# Patient Record
Sex: FEMALE | Race: WHITE | NOT HISPANIC OR LATINO | Employment: FULL TIME | URBAN - METROPOLITAN AREA
[De-identification: names, ages, dates, MRNs, and addresses within clinical notes are randomized per-mention and may not be internally consistent; named-entity substitution may affect disease eponyms.]

---

## 2017-09-10 ENCOUNTER — HOSPITAL ENCOUNTER (EMERGENCY)
Facility: HOSPITAL | Age: 27
Discharge: HOME | End: 2017-09-10
Attending: EMERGENCY MEDICINE
Payer: MEDICARE

## 2017-09-10 ENCOUNTER — APPOINTMENT (EMERGENCY)
Dept: CT IMAGING | Facility: HOSPITAL | Age: 27
End: 2017-09-10
Payer: MEDICARE

## 2017-09-10 VITALS
HEIGHT: 62 IN | BODY MASS INDEX: 19.14 KG/M2 | OXYGEN SATURATION: 100 % | WEIGHT: 104 LBS | SYSTOLIC BLOOD PRESSURE: 99 MMHG | TEMPERATURE: 98.8 F | HEART RATE: 78 BPM | DIASTOLIC BLOOD PRESSURE: 69 MMHG | RESPIRATION RATE: 20 BRPM

## 2017-09-10 DIAGNOSIS — R10.9 FLANK PAIN: Primary | ICD-10-CM

## 2017-09-10 LAB
ALBUMIN SERPL BCP-MCNC: 2.6 G/DL (ref 3.5–5)
ALP SERPL-CCNC: 95 U/L (ref 46–116)
ALT SERPL W P-5'-P-CCNC: 12 U/L (ref 12–78)
ANION GAP SERPL CALCULATED.3IONS-SCNC: 10 MMOL/L (ref 4–13)
AST SERPL W P-5'-P-CCNC: 17 U/L (ref 5–45)
BASOPHILS # BLD AUTO: 0.01 THOUSANDS/ΜL (ref 0–0.1)
BASOPHILS NFR BLD AUTO: 0 % (ref 0–1)
BILIRUB SERPL-MCNC: 0.3 MG/DL (ref 0.2–1)
BUN SERPL-MCNC: 10 MG/DL (ref 5–25)
CALCIUM SERPL-MCNC: 8.5 MG/DL (ref 8.3–10.1)
CHLORIDE SERPL-SCNC: 104 MMOL/L (ref 100–108)
CLARITY, POC: NORMAL
CO2 SERPL-SCNC: 27 MMOL/L (ref 21–32)
COLOR, POC: YELLOW
CREAT SERPL-MCNC: 1.1 MG/DL (ref 0.6–1.3)
EOSINOPHIL # BLD AUTO: 0.14 THOUSAND/ΜL (ref 0–0.61)
EOSINOPHIL NFR BLD AUTO: 3 % (ref 0–6)
ERYTHROCYTE [DISTWIDTH] IN BLOOD BY AUTOMATED COUNT: 14.3 % (ref 11.6–15.1)
EXT BILIRUBIN, UA: NEGATIVE
EXT BLOOD URINE: NEGATIVE
EXT GLUCOSE, UA: NEGATIVE
EXT KETONES: NEGATIVE
EXT NITRITE, UA: NEGATIVE
EXT PH, UA: 6
EXT PROTEIN, UA: NEGATIVE
EXT SPECIFIC GRAVITY, UA: 1.02
EXT UROBILINOGEN: 0.2
GFR SERPL CREATININE-BSD FRML MDRD: 69 ML/MIN/1.73SQ M
GLUCOSE SERPL-MCNC: 84 MG/DL (ref 65–140)
HCG UR QL: NEGATIVE
HCT VFR BLD AUTO: 35.7 % (ref 34.8–46.1)
HGB BLD-MCNC: 11.6 G/DL (ref 11.5–15.4)
LYMPHOCYTES # BLD AUTO: 0.83 THOUSANDS/ΜL (ref 0.6–4.47)
LYMPHOCYTES NFR BLD AUTO: 16 % (ref 14–44)
MCH RBC QN AUTO: 27.8 PG (ref 26.8–34.3)
MCHC RBC AUTO-ENTMCNC: 32.5 G/DL (ref 31.4–37.4)
MCV RBC AUTO: 86 FL (ref 82–98)
MONOCYTES # BLD AUTO: 0.43 THOUSAND/ΜL (ref 0.17–1.22)
MONOCYTES NFR BLD AUTO: 8 % (ref 4–12)
NEUTROPHILS # BLD AUTO: 3.91 THOUSANDS/ΜL (ref 1.85–7.62)
NEUTS SEG NFR BLD AUTO: 73 % (ref 43–75)
PLATELET # BLD AUTO: 271 THOUSANDS/UL (ref 149–390)
PMV BLD AUTO: 10.4 FL (ref 8.9–12.7)
POTASSIUM SERPL-SCNC: 3.9 MMOL/L (ref 3.5–5.3)
PROT SERPL-MCNC: 6.7 G/DL (ref 6.4–8.2)
RBC # BLD AUTO: 4.17 MILLION/UL (ref 3.81–5.12)
SODIUM SERPL-SCNC: 141 MMOL/L (ref 136–145)
TROPONIN I SERPL-MCNC: <0.02 NG/ML
WBC # BLD AUTO: 5.32 THOUSAND/UL (ref 4.31–10.16)
WBC # BLD EST: NEGATIVE 10*3/UL

## 2017-09-10 PROCEDURE — 74176 CT ABD & PELVIS W/O CONTRAST: CPT

## 2017-09-10 PROCEDURE — 85025 COMPLETE CBC W/AUTO DIFF WBC: CPT | Performed by: EMERGENCY MEDICINE

## 2017-09-10 PROCEDURE — 93005 ELECTROCARDIOGRAM TRACING: CPT | Performed by: EMERGENCY MEDICINE

## 2017-09-10 PROCEDURE — 81002 URINALYSIS NONAUTO W/O SCOPE: CPT | Performed by: EMERGENCY MEDICINE

## 2017-09-10 PROCEDURE — 36415 COLL VENOUS BLD VENIPUNCTURE: CPT | Performed by: EMERGENCY MEDICINE

## 2017-09-10 PROCEDURE — 84484 ASSAY OF TROPONIN QUANT: CPT | Performed by: EMERGENCY MEDICINE

## 2017-09-10 PROCEDURE — 81025 URINE PREGNANCY TEST: CPT | Performed by: EMERGENCY MEDICINE

## 2017-09-10 PROCEDURE — 80053 COMPREHEN METABOLIC PANEL: CPT | Performed by: EMERGENCY MEDICINE

## 2017-09-10 PROCEDURE — 99285 EMERGENCY DEPT VISIT HI MDM: CPT

## 2017-09-10 RX ORDER — BUPRENORPHINE HYDROCHLORIDE 8 MG/1
8 TABLET SUBLINGUAL DAILY
Status: ON HOLD | COMMUNITY
End: 2020-10-29 | Stop reason: CLARIF

## 2017-09-11 LAB
ATRIAL RATE: 70 BPM
P AXIS: 50 DEGREES
PR INTERVAL: 116 MS
QRS AXIS: 82 DEGREES
QRSD INTERVAL: 76 MS
QT INTERVAL: 394 MS
QTC INTERVAL: 425 MS
T WAVE AXIS: 12 DEGREES
VENTRICULAR RATE: 70 BPM

## 2017-11-10 ENCOUNTER — TRANSCRIBE ORDERS (OUTPATIENT)
Dept: ADMINISTRATIVE | Facility: HOSPITAL | Age: 27
End: 2017-11-10

## 2017-11-10 DIAGNOSIS — K50.919 CROHN'S DISEASE WITH COMPLICATION, UNSPECIFIED GASTROINTESTINAL TRACT LOCATION (HCC): Primary | ICD-10-CM

## 2017-12-08 ENCOUNTER — GENERIC CONVERSION - ENCOUNTER (OUTPATIENT)
Dept: OTHER | Facility: OTHER | Age: 27
End: 2017-12-08

## 2017-12-08 ENCOUNTER — HOSPITAL ENCOUNTER (OUTPATIENT)
Dept: RADIOLOGY | Facility: HOSPITAL | Age: 27
Discharge: HOME | End: 2017-12-08
Payer: MEDICARE

## 2017-12-08 DIAGNOSIS — K50.919 CROHN'S DISEASE WITH COMPLICATION, UNSPECIFIED GASTROINTESTINAL TRACT LOCATION (HCC): ICD-10-CM

## 2017-12-08 PROCEDURE — A9585 GADOBUTROL INJECTION: HCPCS | Performed by: RADIOLOGY

## 2017-12-08 PROCEDURE — 72197 MRI PELVIS W/O & W/DYE: CPT

## 2017-12-08 PROCEDURE — 74183 MRI ABD W/O CNTR FLWD CNTR: CPT

## 2017-12-08 RX ADMIN — GLUCAGON HYDROCHLORIDE 1 MG: KIT at 11:45

## 2017-12-08 RX ADMIN — GADOBUTROL 4.72 ML: 604.72 INJECTION INTRAVENOUS at 12:16

## 2018-03-27 ENCOUNTER — TRANSCRIBE ORDERS (OUTPATIENT)
Dept: ADMINISTRATIVE | Facility: HOSPITAL | Age: 28
End: 2018-03-27

## 2018-03-27 ENCOUNTER — HOSPITAL ENCOUNTER (OUTPATIENT)
Dept: RADIOLOGY | Facility: HOSPITAL | Age: 28
Discharge: HOME | End: 2018-03-27
Payer: MEDICARE

## 2018-03-27 DIAGNOSIS — R06.02 SHORTNESS OF BREATH: Primary | ICD-10-CM

## 2018-03-27 DIAGNOSIS — R06.02 SHORTNESS OF BREATH: ICD-10-CM

## 2018-03-27 PROCEDURE — 71046 X-RAY EXAM CHEST 2 VIEWS: CPT

## 2019-06-05 ENCOUNTER — HOSPITAL ENCOUNTER (EMERGENCY)
Facility: HOSPITAL | Age: 29
Discharge: HOME | End: 2019-06-05
Attending: EMERGENCY MEDICINE
Payer: COMMERCIAL

## 2019-06-05 VITALS
SYSTOLIC BLOOD PRESSURE: 112 MMHG | RESPIRATION RATE: 17 BRPM | WEIGHT: 105 LBS | HEIGHT: 62 IN | HEART RATE: 100 BPM | BODY MASS INDEX: 19.32 KG/M2 | DIASTOLIC BLOOD PRESSURE: 64 MMHG | OXYGEN SATURATION: 98 %

## 2019-06-05 DIAGNOSIS — R59.0 LYMPHADENOPATHY OF LEFT CERVICAL REGION: Primary | ICD-10-CM

## 2019-06-05 PROCEDURE — 99283 EMERGENCY DEPT VISIT LOW MDM: CPT

## 2019-06-05 PROCEDURE — 99282 EMERGENCY DEPT VISIT SF MDM: CPT | Performed by: EMERGENCY MEDICINE

## 2019-06-05 RX ORDER — IBUPROFEN 400 MG/1
400 TABLET ORAL ONCE
Status: COMPLETED | OUTPATIENT
Start: 2019-06-05 | End: 2019-06-05

## 2019-06-05 RX ADMIN — IBUPROFEN 400 MG: 400 TABLET ORAL at 13:44

## 2019-06-06 ENCOUNTER — HOSPITAL ENCOUNTER (EMERGENCY)
Facility: HOSPITAL | Age: 29
Discharge: HOME | End: 2019-06-06
Attending: EMERGENCY MEDICINE | Admitting: EMERGENCY MEDICINE
Payer: COMMERCIAL

## 2019-06-06 VITALS
OXYGEN SATURATION: 98 % | WEIGHT: 109.35 LBS | RESPIRATION RATE: 16 BRPM | DIASTOLIC BLOOD PRESSURE: 52 MMHG | BODY MASS INDEX: 20 KG/M2 | SYSTOLIC BLOOD PRESSURE: 96 MMHG | TEMPERATURE: 97.5 F | HEART RATE: 80 BPM

## 2019-06-06 DIAGNOSIS — R59.1 LYMPHADENOPATHY: Primary | ICD-10-CM

## 2019-06-06 PROCEDURE — 99283 EMERGENCY DEPT VISIT LOW MDM: CPT

## 2019-06-06 PROCEDURE — 99283 EMERGENCY DEPT VISIT LOW MDM: CPT | Performed by: PHYSICIAN ASSISTANT

## 2019-06-06 RX ORDER — IBUPROFEN 200 MG
TABLET ORAL EVERY 6 HOURS PRN
Status: ON HOLD | COMMUNITY
End: 2020-10-29 | Stop reason: CLARIF

## 2019-06-06 RX ORDER — CEPHALEXIN 500 MG/1
500 CAPSULE ORAL EVERY 8 HOURS SCHEDULED
Qty: 21 CAPSULE | Refills: 0 | Status: SHIPPED | OUTPATIENT
Start: 2019-06-06 | End: 2019-06-13

## 2019-10-11 ENCOUNTER — TRANSCRIBE ORDERS (OUTPATIENT)
Dept: ADMINISTRATIVE | Facility: HOSPITAL | Age: 29
End: 2019-10-11

## 2019-10-11 DIAGNOSIS — R06.02 SOB (SHORTNESS OF BREATH): Primary | ICD-10-CM

## 2020-02-20 ENCOUNTER — HOSPITAL ENCOUNTER (EMERGENCY)
Facility: HOSPITAL | Age: 30
Discharge: HOME | End: 2020-02-20
Attending: EMERGENCY MEDICINE | Admitting: EMERGENCY MEDICINE
Payer: COMMERCIAL

## 2020-02-20 VITALS
HEART RATE: 95 BPM | RESPIRATION RATE: 16 BRPM | WEIGHT: 110 LBS | DIASTOLIC BLOOD PRESSURE: 68 MMHG | HEIGHT: 62 IN | TEMPERATURE: 98.1 F | SYSTOLIC BLOOD PRESSURE: 128 MMHG | BODY MASS INDEX: 20.24 KG/M2 | OXYGEN SATURATION: 100 %

## 2020-02-20 DIAGNOSIS — M54.16 LUMBAR BACK PAIN WITH RADICULOPATHY AFFECTING RIGHT LOWER EXTREMITY: Primary | ICD-10-CM

## 2020-02-20 PROCEDURE — 99284 EMERGENCY DEPT VISIT MOD MDM: CPT | Performed by: EMERGENCY MEDICINE

## 2020-02-20 PROCEDURE — 96372 THER/PROPH/DIAG INJ SC/IM: CPT

## 2020-02-20 PROCEDURE — 99283 EMERGENCY DEPT VISIT LOW MDM: CPT

## 2020-02-20 RX ORDER — ACETAMINOPHEN 325 MG/1
975 TABLET ORAL ONCE
Status: COMPLETED | OUTPATIENT
Start: 2020-02-20 | End: 2020-02-20

## 2020-02-20 RX ORDER — LIDOCAINE 50 MG/G
1 PATCH TOPICAL ONCE
Status: DISCONTINUED | OUTPATIENT
Start: 2020-02-20 | End: 2020-02-20 | Stop reason: HOSPADM

## 2020-02-20 RX ORDER — KETOROLAC TROMETHAMINE 30 MG/ML
15 INJECTION, SOLUTION INTRAMUSCULAR; INTRAVENOUS ONCE
Status: COMPLETED | OUTPATIENT
Start: 2020-02-20 | End: 2020-02-20

## 2020-02-20 RX ADMIN — ACETAMINOPHEN 975 MG: 325 TABLET, FILM COATED ORAL at 20:02

## 2020-02-20 RX ADMIN — KETOROLAC TROMETHAMINE 15 MG: 30 INJECTION, SOLUTION INTRAMUSCULAR; INTRAVENOUS at 20:05

## 2020-02-20 RX ADMIN — LIDOCAINE 1 PATCH: 50 PATCH TOPICAL at 20:01

## 2020-02-21 ENCOUNTER — TELEPHONE (OUTPATIENT)
Dept: PHYSICAL THERAPY | Facility: OTHER | Age: 30
End: 2020-02-21

## 2020-02-21 NOTE — ED PROVIDER NOTES
History  Chief Complaint   Patient presents with    Back Pain     Pt with right siatic pain x 3-4 days  Denies injury  26year old female presents for evaluation of lumbar back pain radiating to the right buttock and down the right thigh for the past 3 days  Pain worsens with movement, bending and twisting  Patient states that she lifts weights at the gym and does a lot of heavy lifting, twisting and bending movements at work  Patient took ibuprofen for her pain with the last dose this morning  No significant improvement after the ibuprofen  Similar back pain in the past  She denies recent injury  No fevers, chills, nausea, vomiting, abdominal pain, constipation or diarrhea  No difficulty with urination  Currently menstruating  Regular cycles with no increase in vaginal bleeding from prior periods  History provided by:  Patient  Back Pain   Location:  Lumbar spine  Quality:  Aching and shooting  Radiates to:  R thigh  Pain severity:  Severe  Onset quality:  Gradual  Duration:  3 days  Timing:  Constant  Progression:  Waxing and waning  Chronicity:  Recurrent  Context: physical stress    Relieved by:  Nothing  Worsened by:  Twisting, movement and bending  Ineffective treatments:  NSAIDs  Associated symptoms: no abdominal pain, no chest pain, no dysuria, no fever, no headaches and no weakness        Prior to Admission Medications   Prescriptions Last Dose Informant Patient Reported? Taking? buprenorphine (SUBUTEX) 8 mg 2/20/2020 at Unknown time  Yes Yes   Sig: Place under the tongue   ibuprofen (MOTRIN) 200 mg tablet 2/20/2020 at Unknown time  Yes Yes   Sig: Take by mouth every 6 (six) hours as needed for mild pain      Facility-Administered Medications: None       Past Medical History:   Diagnosis Date    Crohn disease ([facility] [address] )        History reviewed  No pertinent surgical history  History reviewed  No pertinent family history  I have reviewed and agree with the history as documented      Social History     Tobacco Use    Smoking status: Light Tobacco Smoker    Smokeless tobacco: Never Used   Substance Use Topics    Alcohol use: Yes    Drug use: No     Comment: hx of percocet and dilaudid abuse        Review of Systems   Constitutional: Negative for appetite change, chills and fever  HENT: Negative for congestion, rhinorrhea and sore throat  Respiratory: Negative for cough, chest tightness and shortness of breath  Cardiovascular: Negative for chest pain, palpitations and leg swelling  Gastrointestinal: Negative for abdominal pain, constipation, diarrhea, nausea and vomiting  Genitourinary: Positive for vaginal bleeding (typical of usual menses )  Negative for dysuria, frequency and hematuria  Musculoskeletal: Positive for back pain  Negative for myalgias, neck pain and neck stiffness  Skin: Negative for pallor  Neurological: Negative for syncope, weakness and headaches  All other systems reviewed and are negative  Physical Exam  Physical Exam   Constitutional: She is oriented to person, place, and time  She appears well-developed and well-nourished  Non-toxic appearance  No distress  HENT:   Head: Normocephalic and atraumatic  Eyes: Pupils are equal, round, and reactive to light  Conjunctivae and EOM are normal    Neck: Normal range of motion  Neck supple  No tracheal deviation present  No thyromegaly present  Cardiovascular: Normal rate, regular rhythm, normal heart sounds and intact distal pulses  Pulmonary/Chest: Effort normal and breath sounds normal    Abdominal: Soft  Bowel sounds are normal  She exhibits no distension  There is no tenderness  Musculoskeletal:   No midline C/T/L spine tenderness  No step offs or deformities  Paraspinal tenderness bilaterally lower lumbar back  Positive straight leg on right  Normal distal sensation and perfusion  2+ DP/PT pulses bilaterally  Lymphadenopathy:     She has no cervical adenopathy     Neurological: She is alert and oriented to person, place, and time  Skin: Skin is warm and dry  She is not diaphoretic  Nursing note and vitals reviewed  Vital Signs  ED Triage Vitals [02/20/20 1926]   Temperature Pulse Respirations Blood Pressure SpO2   98 1 °F (36 7 °C) 95 16 128/68 100 %      Temp Source Heart Rate Source Patient Position - Orthostatic VS BP Location FiO2 (%)   Temporal Monitor Sitting Left arm --      Pain Score       7           Vitals:    02/20/20 1926   BP: 128/68   Pulse: 95   Patient Position - Orthostatic VS: Sitting         Visual Acuity      ED Medications  Medications   lidocaine (LIDODERM) 5 % patch 1 patch (1 patch Topical Medication Applied 2/20/20 2001)   ketorolac (TORADOL) injection 15 mg (15 mg Intramuscular Given 2/20/20 2005)   acetaminophen (TYLENOL) tablet 975 mg (975 mg Oral Given 2/20/20 2002)       Diagnostic Studies  Results Reviewed     None                 No orders to display              Procedures  Procedures         ED Course                               MDM  Number of Diagnoses or Management Options  Lumbar back pain with radiculopathy affecting right lower extremity: new and requires workup  Diagnosis management comments: 26year old female presents with lumbar back pain for 3 days  History of similar in the past from heavy lifting, twisting and bending movements at work and at the gym  No midline tenderness on exam  No recent trauma  No fevers  No urinary difficulty or numbness  Lidoderm, tylenol, toradol  Comprehensive Spine referral  Return precautions provided       Patient Progress  Patient progress: stable        Disposition  Final diagnoses:   Lumbar back pain with radiculopathy affecting right lower extremity     Time reflects when diagnosis was documented in both MDM as applicable and the Disposition within this note     Time User Action Codes Description Comment    2/20/2020  7:37 PM Brooke Verde Add [S66 [id number]] Lumbar back pain with radiculopathy affecting right lower extremity       ED Disposition     ED Disposition Condition Date/Time Comment    Discharge Stable Thu Feb 20, 2020  7:36 PM Ngoc Rylan discharge to home/self care              Follow-up Information     Follow up With Specialties Details Why Contact Info Additional Information    [facility] Physical Therapy Call  for further management of your back pain     Alan Torres, DO Internal Medicine  As needed [address] [facility] [address]         [address] Emergency Department Emergency Medicine Go to  If symptoms worsen, severe weakness, numbness, difficulty going to the bathroom, fever >101 F [address] [address]  [phone]  ED, [address] [facility], Shey [place], [address]          Discharge Medication List as of 2/20/2020  7:39 PM      CONTINUE these medications which have NOT CHANGED    Details   buprenorphine (SUBUTEX) 8 mg Place under the tongue, Historical Med      ibuprofen (MOTRIN) 200 mg tablet Take by mouth every 6 (six) hours as needed for mild pain, Historical Med               PDMP Review     None          ED Provider  Electronically Signed by           Trung Temple MD  02/20/20 2010

## 2020-02-21 NOTE — TELEPHONE ENCOUNTER
Voice mail/message left for patient to return call to [place] [id number] program including our hours of business and phone number  First attempt to contact patient regarding recent referral to our program  Deferred per protocol for f/u

## 2020-02-21 NOTE — DISCHARGE INSTRUCTIONS
Take 1000 mg of acetaminophen (Tylenol) with 400 mg of ibuprofen (Advil) every 6-8 hours as needed for pain  Lidocaine patches are available over-the-counter can be found in the back pain aisle of most pharmacies  Look for 4% lidocaine in the list of the active ingredients  These patches can be placed for 12 hours  After 12 hours, discard the patch  The next patch can be placed 12 hours later  Acute Low Back Pain   WHAT YOU NEED TO KNOW:   Acute low back pain is sudden discomfort in your lower back area that lasts for up to 6 weeks  The discomfort makes it difficult to tolerate activity  DISCHARGE INSTRUCTIONS:   Return to the emergency department if:   You have severe pain  You have sudden stiffness and heaviness on both buttocks down to both legs  You have numbness or weakness in one leg, or pain in both legs  You have numbness in your genital area or across your lower back  You cannot control your urine or bowel movements  Contact your healthcare provider if:   You have a fever  You have pain at night or when you rest     Your pain does not get better with treatment  You have pain that worsens when you cough or sneeze  You suddenly feel something pop or snap in your back  You have questions or concerns about your condition or care  Medicines: The following medicines may be ordered by your healthcare provider:  Acetaminophen  decreases pain  It is available without a doctor's order  Ask how much to take and how often to take it  Follow directions  Acetaminophen can cause liver damage if not taken correctly  NSAIDs  help decrease swelling and pain  This medicine is available with or without a doctor's order  NSAIDs can cause stomach bleeding or kidney problems in certain people  If you take blood thinner medicine, always ask your healthcare provider if NSAIDs are safe for you  Always read the medicine label and follow directions      Prescription pain medicine  may be given  Ask your healthcare provider how to take this medicine safely  Muscle relaxers  decrease pain by relaxing the muscles in your lower spine  Take your medicine as directed  Contact your healthcare provider if you think your medicine is not helping or if you have side effects  Tell him of her if you are allergic to any medicine  Keep a list of the medicines, vitamins, and herbs you take  Include the amounts, and when and why you take them  Bring the list or the pill bottles to follow-up visits  Carry your medicine list with you in case of an emergency  Self-care:   Stay active  as much as you can without causing more pain  Bed rest could make your back pain worse  Start with some light exercises such as walking  Avoid heavy lifting until your pain is gone  Ask for more information about the activities or exercises that are right for you  Ice  helps decrease swelling, pain, and muscle spams  Put crushed ice in a plastic bag  Cover it with a towel  Place it on your lower back for 20 to 30 minutes every 2 hours  Do this for about 2 to 3 days after your pain starts, or as directed  Heat  helps decrease pain and muscle spasms  Start to use heat after treatment with ice has stopped  Use a small towel dampened with warm water or a heating pad, or sit in a warm bath  Apply heat on the area for 20 to 30 minutes every 2 hours for as many days as directed  Alternate heat and ice  Prevent acute low back pain:   Use proper body mechanics  Bend at the hips and knees when you  objects  Do not bend from the waist  Use your leg muscles as you lift the load  Do not use your back  Keep the object close to your chest as you lift it  Try not to twist or lift anything above your waist     Change your position often when you stand for long periods of time  Rest one foot on a small box or footrest, and then switch to the other foot often  Try not to sit for long periods of time   When you do, sit in a straight-backed chair with your feet flat on the floor  Never reach, pull, or push while you are sitting  Do exercises that strengthen your back muscles  Warm up before you exercise  Ask your healthcare provider the best exercises for you  Maintain a healthy weight  Ask your healthcare provider how much you should weigh  Ask him to help you create a weight loss plan if you are overweight  Follow up with your healthcare provider as directed:  Return for a follow-up visit if you still have pain after 1 to 3 weeks of treatment  You may need to visit an orthopedist if your back pain lasts more than 12 weeks  Write down your questions so you remember to ask them during your visits  © 2017 [facility] Information is for End User's use only and may not be sold, redistributed or otherwise used for commercial purposes  All illustrations and images included in CareNotes® are the copyrighted property of [employer]  or Jesus Manuel Davalos  The above information is an  only  It is not intended as medical advice for individual conditions or treatments  Talk to your doctor, nurse or pharmacist before following any medical regimen to see if it is safe and effective for you

## 2020-02-22 NOTE — ED RE-EVALUATION NOTE
Addendum 2/21/2020 2005:  Patient called requesting a work note to return to work in 3 days instead of 5 days  I told patient to f/u with PCP to get a release to work in 3 days if she is feeling better after 3 days        Gennett Boast, PA-C  02/21/20 2006

## 2020-03-06 ENCOUNTER — TELEPHONE (OUTPATIENT)
Dept: PHYSICAL THERAPY | Facility: OTHER | Age: 30
End: 2020-03-06

## 2020-03-06 NOTE — TELEPHONE ENCOUNTER
Voice mail/message left for patient to return call to [place] [id number] program including our hours of business and phone number       Second attempt to contact regarding referral   Referral closed per protocol

## 2020-03-26 ENCOUNTER — HOSPITAL ENCOUNTER (EMERGENCY)
Facility: HOSPITAL | Age: 30
Discharge: HOME | End: 2020-03-26
Attending: EMERGENCY MEDICINE | Admitting: EMERGENCY MEDICINE
Payer: COMMERCIAL

## 2020-03-26 ENCOUNTER — APPOINTMENT (EMERGENCY)
Dept: CT IMAGING | Facility: HOSPITAL | Age: 30
End: 2020-03-26
Payer: COMMERCIAL

## 2020-03-26 VITALS
OXYGEN SATURATION: 98 % | HEART RATE: 70 BPM | TEMPERATURE: 98.3 F | DIASTOLIC BLOOD PRESSURE: 61 MMHG | RESPIRATION RATE: 18 BRPM | BODY MASS INDEX: 20.12 KG/M2 | SYSTOLIC BLOOD PRESSURE: 95 MMHG | WEIGHT: 110 LBS

## 2020-03-26 DIAGNOSIS — K50.911 ACUTE CROHN'S DISEASE WITH RECTAL BLEEDING (HCC): Primary | ICD-10-CM

## 2020-03-26 LAB
ABO GROUP BLD: NORMAL
ABO GROUP BLD: NORMAL
ALBUMIN SERPL BCP-MCNC: 2.6 G/DL (ref 3.5–5)
ALP SERPL-CCNC: 75 U/L (ref 46–116)
ALT SERPL W P-5'-P-CCNC: 14 U/L (ref 12–78)
ANION GAP SERPL CALCULATED.3IONS-SCNC: 5 MMOL/L (ref 4–13)
AST SERPL W P-5'-P-CCNC: 17 U/L (ref 5–45)
BASOPHILS # BLD AUTO: 0.02 THOUSANDS/ΜL (ref 0–0.1)
BASOPHILS NFR BLD AUTO: 0 % (ref 0–1)
BILIRUB SERPL-MCNC: 0.2 MG/DL (ref 0.2–1)
BLD GP AB SCN SERPL QL: NEGATIVE
BUN SERPL-MCNC: 10 MG/DL (ref 5–25)
CALCIUM SERPL-MCNC: 8.4 MG/DL (ref 8.3–10.1)
CHLORIDE SERPL-SCNC: 104 MMOL/L (ref 100–108)
CLARITY, POC: NORMAL
CO2 SERPL-SCNC: 30 MMOL/L (ref 21–32)
COLOR, POC: YELLOW
CREAT SERPL-MCNC: 1.08 MG/DL (ref 0.6–1.3)
EOSINOPHIL # BLD AUTO: 0.06 THOUSAND/ΜL (ref 0–0.61)
EOSINOPHIL NFR BLD AUTO: 1 % (ref 0–6)
ERYTHROCYTE [DISTWIDTH] IN BLOOD BY AUTOMATED COUNT: 28.3 % (ref 11.6–15.1)
EXT BILIRUBIN, UA: NORMAL
EXT BLOOD URINE: NORMAL
EXT GLUCOSE, UA: NORMAL
EXT KETONES: NORMAL
EXT NITRITE, UA: NORMAL
EXT PH, UA: 6
EXT PREG TEST URINE: NEGATIVE
EXT PROTEIN, UA: NORMAL
EXT SPECIFIC GRAVITY, UA: 1.02
EXT UROBILINOGEN: 0.2
EXT. CONTROL ED NAV: NORMAL
GFR SERPL CREATININE-BSD FRML MDRD: 69 ML/MIN/1.73SQ M
GLUCOSE SERPL-MCNC: 92 MG/DL (ref 65–140)
HCT VFR BLD AUTO: 30 % (ref 34.8–46.1)
HGB BLD-MCNC: 7.9 G/DL (ref 11.5–15.4)
IMM GRANULOCYTES # BLD AUTO: 0.01 THOUSAND/UL (ref 0–0.2)
IMM GRANULOCYTES NFR BLD AUTO: 0 % (ref 0–2)
LYMPHOCYTES # BLD AUTO: 0.38 THOUSANDS/ΜL (ref 0.6–4.47)
LYMPHOCYTES NFR BLD AUTO: 7 % (ref 14–44)
MCH RBC QN AUTO: 20.9 PG (ref 26.8–34.3)
MCHC RBC AUTO-ENTMCNC: 26.3 G/DL (ref 31.4–37.4)
MCV RBC AUTO: 79 FL (ref 82–98)
MONOCYTES # BLD AUTO: 0.34 THOUSAND/ΜL (ref 0.17–1.22)
MONOCYTES NFR BLD AUTO: 7 % (ref 4–12)
NEUTROPHILS # BLD AUTO: 4.38 THOUSANDS/ΜL (ref 1.85–7.62)
NEUTS SEG NFR BLD AUTO: 85 % (ref 43–75)
NRBC BLD AUTO-RTO: 0 /100 WBCS
PLATELET # BLD AUTO: 310 THOUSANDS/UL (ref 149–390)
PMV BLD AUTO: 9.6 FL (ref 8.9–12.7)
POTASSIUM SERPL-SCNC: 3.8 MMOL/L (ref 3.5–5.3)
PROT SERPL-MCNC: 6.3 G/DL (ref 6.4–8.2)
RBC # BLD AUTO: 3.78 MILLION/UL (ref 3.81–5.12)
RH BLD: POSITIVE
RH BLD: POSITIVE
SODIUM SERPL-SCNC: 139 MMOL/L (ref 136–145)
SPECIMEN EXPIRATION DATE: NORMAL
WBC # BLD AUTO: 5.19 THOUSAND/UL (ref 4.31–10.16)
WBC # BLD EST: NORMAL 10*3/UL

## 2020-03-26 PROCEDURE — 86900 BLOOD TYPING SEROLOGIC ABO: CPT | Performed by: PHYSICIAN ASSISTANT

## 2020-03-26 PROCEDURE — 85025 COMPLETE CBC W/AUTO DIFF WBC: CPT | Performed by: PHYSICIAN ASSISTANT

## 2020-03-26 PROCEDURE — 74177 CT ABD & PELVIS W/CONTRAST: CPT

## 2020-03-26 PROCEDURE — 81002 URINALYSIS NONAUTO W/O SCOPE: CPT | Performed by: PHYSICIAN ASSISTANT

## 2020-03-26 PROCEDURE — 81025 URINE PREGNANCY TEST: CPT | Performed by: PHYSICIAN ASSISTANT

## 2020-03-26 PROCEDURE — 96374 THER/PROPH/DIAG INJ IV PUSH: CPT

## 2020-03-26 PROCEDURE — 99285 EMERGENCY DEPT VISIT HI MDM: CPT

## 2020-03-26 PROCEDURE — 80053 COMPREHEN METABOLIC PANEL: CPT | Performed by: PHYSICIAN ASSISTANT

## 2020-03-26 PROCEDURE — 99285 EMERGENCY DEPT VISIT HI MDM: CPT | Performed by: PHYSICIAN ASSISTANT

## 2020-03-26 PROCEDURE — 36415 COLL VENOUS BLD VENIPUNCTURE: CPT | Performed by: PHYSICIAN ASSISTANT

## 2020-03-26 PROCEDURE — 86901 BLOOD TYPING SEROLOGIC RH(D): CPT | Performed by: PHYSICIAN ASSISTANT

## 2020-03-26 PROCEDURE — 86850 RBC ANTIBODY SCREEN: CPT | Performed by: PHYSICIAN ASSISTANT

## 2020-03-26 RX ORDER — PREDNISONE 20 MG/1
TABLET ORAL
Qty: 70 TABLET | Refills: 0 | Status: SHIPPED | OUTPATIENT
Start: 2020-03-26 | End: 2020-10-28 | Stop reason: ALTCHOICE

## 2020-03-26 RX ORDER — DOCUSATE SODIUM 100 MG/1
100 CAPSULE, LIQUID FILLED ORAL 3 TIMES DAILY PRN
Qty: 60 CAPSULE | Refills: 0 | Status: SHIPPED | OUTPATIENT
Start: 2020-03-26 | End: 2020-10-28 | Stop reason: ALTCHOICE

## 2020-03-26 RX ORDER — DICYCLOMINE HCL 20 MG
20 TABLET ORAL EVERY 6 HOURS
Qty: 20 TABLET | Refills: 0 | Status: SHIPPED | OUTPATIENT
Start: 2020-03-26 | End: 2020-04-08 | Stop reason: ALTCHOICE

## 2020-03-26 RX ADMIN — IOHEXOL 100 ML: 350 INJECTION, SOLUTION INTRAVENOUS at 17:34

## 2020-03-26 RX ADMIN — MORPHINE SULFATE 2 MG: 2 INJECTION, SOLUTION INTRAMUSCULAR; INTRAVENOUS at 16:43

## 2020-03-26 NOTE — ED PROVIDER NOTES
History  Chief Complaint   Patient presents with    Rectal Bleeding     c/o rectal bleeding and pain x 1 month  Pt states she had a fever, now states "I just feel good"     27-year-old female with history of Crohn's disease presents emergency department for evaluation of abdominal pain and bloody stool times 1-2 months  Patient is currently not on any Crohn's maintenance therapy, states she has been managing her symptoms well with dietary precautions and exercise  She was previously followed by the [facility] gastroenterology, has been on various maintenance therapies including methotrexate, Humira, and Entocort  Due to her persistent bloody stools, she has been getting weekly IV iron infusions due to low hemoglobin  Her last infusion was yesterday, at that time her hemoglobin was 7 4  She states that today she had a fever of 102 F and was sent home from work  She denies any respiratory symptoms such as coughing or shortness of breath  She does admit to lightheadedness and general fatigue  Also states that her abdominal pain is intermittent vomiting is much more severe than any previous flare up  She has recently established local GI care with Eliezer Owens, has an upcoming appointment for possible scope in 5 days  Will be seeing Dr Phillip Rosas  On exam, the patient is overall well-appearing in no apparent distress  She is mildly tachycardic and does occasionally appear uncomfortable while clutching her abdomen  She is generally thin does not appear well nourished  Abdomen is soft, diffusely tender to palpation, without peritoneal signs  Extremities are well perfused with strong symmetric pulses  She has moist mucous membranes and no rashes  A/P:  Hematochezia  Likely Crohn's flare up given the duration of symptoms  Will check CBC, CMP, type and screen in the event of need for blood transfusion, CT of the abdomen and pelvis to evaluate for severity    Plan to discuss with GI on-call to help with determination of inpatient versus outpatient management  Prior to Admission Medications   Prescriptions Last Dose Informant Patient Reported? Taking? buprenorphine (SUBUTEX) 8 mg   Yes No   Sig: Place under the tongue   ibuprofen (MOTRIN) 200 mg tablet   Yes No   Sig: Take by mouth every 6 (six) hours as needed for mild pain      Facility-Administered Medications: None       Past Medical History:   Diagnosis Date    Crohn disease ([facility] [address] )        History reviewed  No pertinent surgical history  History reviewed  No pertinent family history  I have reviewed and agree with the history as documented  E-Cigarette/Vaping    E-Cigarette Use Never User      E-Cigarette/Vaping Substances    Nicotine No     THC No     CBD No     Flavoring No     Other No     Unknown No      Social History     Tobacco Use    Smoking status: Former Smoker     Types: Cigarettes    Smokeless tobacco: Never Used   Substance Use Topics    Alcohol use: Never     Frequency: Never    Drug use: No     Comment: hx of percocet and dilaudid abuse        Review of Systems   Constitutional: Positive for fatigue  Negative for chills, diaphoresis and fever  Eyes: Negative for visual disturbance  Respiratory: Negative for cough and shortness of breath  Cardiovascular: Negative for chest pain and palpitations  Gastrointestinal: Positive for abdominal pain, blood in stool, constipation, nausea and vomiting  Negative for diarrhea  Genitourinary: Negative for dysuria, flank pain and frequency  Musculoskeletal: Negative for arthralgias and myalgias  Skin: Negative for color change, rash and wound  Allergic/Immunologic: Negative for immunocompromised state  Neurological: Negative for dizziness and light-headedness  Hematological: Does not bruise/bleed easily  Psychiatric/Behavioral: Negative for confusion  The patient is not nervous/anxious          Physical Exam  Physical Exam   Constitutional: She is oriented to person, place, and time  No distress  Generally thin   HENT:   Head: Normocephalic and atraumatic  Mouth/Throat: Oropharynx is clear and moist    Eyes: Pupils are equal, round, and reactive to light  No scleral icterus  Neck: No JVD present  Cardiovascular: Normal rate and regular rhythm  Exam reveals no gallop and no friction rub  No murmur heard  Pulmonary/Chest: No respiratory distress  She has no wheezes  She has no rales  Abdominal: Soft  Normal appearance and bowel sounds are normal  She exhibits no distension and no mass  There is generalized tenderness  There is no rigidity, no rebound and no guarding (voluntary)  Musculoskeletal: She exhibits no edema  Neurological: She is alert and oriented to person, place, and time  Skin: Skin is warm and dry  Capillary refill takes less than 2 seconds  She is not diaphoretic  No pallor  Psychiatric: She has a normal mood and affect  Her behavior is normal    Vitals reviewed        Vital Signs  ED Triage Vitals [03/26/20 1614]   Temperature Pulse Respirations Blood Pressure SpO2   98 3 °F (36 8 °C) 105 16 125/68 98 %      Temp Source Heart Rate Source Patient Position - Orthostatic VS BP Location FiO2 (%)   Oral Monitor Lying Right arm --      Pain Score       9           Vitals:    03/26/20 1645 03/26/20 1700 03/26/20 1730 03/26/20 1800   BP: 101/57 96/59 120/59 95/61   Pulse: 91 79 90 70   Patient Position - Orthostatic VS: Lying Lying Lying Lying         Visual Acuity      ED Medications  Medications   morphine injection 2 mg (2 mg Intravenous Given 3/26/20 1643)   iohexol (OMNIPAQUE) 350 MG/ML injection (MULTI-DOSE) 100 mL (100 mL Intravenous Given 3/26/20 1734)       Diagnostic Studies  Results Reviewed     Procedure Component Value Units Date/Time    Comprehensive metabolic panel [[id number]]  (Abnormal) Collected:  03/26/20 1633    Lab Status:  Final result Specimen:  Blood from Arm, Left Updated:  03/26/20 1655     Sodium 139 mmol/L Potassium 3 8 mmol/L      Chloride 104 mmol/L      CO2 30 mmol/L      ANION GAP 5 mmol/L      BUN 10 mg/dL      Creatinine 1 08 mg/dL      Glucose 92 mg/dL      Calcium 8 4 mg/dL      AST 17 U/L      ALT 14 U/L      Alkaline Phosphatase 75 U/L      Total Protein 6 3 g/dL      Albumin 2 6 g/dL      Total Bilirubin 0 20 mg/dL      eGFR 69 ml/min/1 73sq m     Narrative:       [place] guidelines for Chronic Kidney Disease (CKD):     Stage 1 with normal or high GFR (GFR > 90 mL/min/1 73 square meters)    Stage 2 Mild CKD (GFR = 60-89 mL/min/1 73 square meters)    Stage 3A Moderate CKD (GFR = 45-59 mL/min/1 73 square meters)    Stage 3B Moderate CKD (GFR = 30-44 mL/min/1 73 square meters)    Stage 4 Severe CKD (GFR = 15-29 mL/min/1 73 square meters)    Stage 5 End Stage CKD (GFR <15 mL/min/1 73 square meters)  Note: GFR calculation is accurate only with a steady state creatinine    POCT urinalysis dipstick [[id number]]  (Normal) Resulted:  03/26/20 1652    Lab Status:  Final result Specimen:  Urine Updated:  03/26/20 1653     Color, UA yellow     Clarity, UA cloudy     Glucose, UA (Ref: Negative) neg     Bilirubin, UA (Ref: Negative) neg     Ketones, UA (Ref: Negative) neg     Spec Grav, UA (Ref:1 003-1 030) 1 020     Blood, UA (Ref: Negative) neg     pH, UA (Ref: 4 5-8 0) 6     Protein, UA (Ref: Negative) trace     Urobilinogen, UA (Ref: 0 2- 1 0) 0 2      Leukocytes, UA (Ref: Negative) neg     Nitrite, UA (Ref: Negative) neg    POCT pregnancy, urine [[id number]]  (Normal) Resulted:  03/26/20 1652    Lab Status:  Final result Updated:  03/26/20 1652     EXT PREG TEST UR (Ref: Negative) negative     Control valid    CBC and differential [[id number]]  (Abnormal) Collected:  03/26/20 1633    Lab Status:  Final result Specimen:  Blood from Arm, Left Updated:  03/26/20 1639     WBC 5 19 Thousand/uL      RBC 3 78 Million/uL      Hemoglobin 7 9 g/dL      Hematocrit 30 0 %      MCV 79 fL MCH 20 9 pg      MCHC 26 3 g/dL      RDW 28 3 %      MPV 9 6 fL      Platelets 968 Thousands/uL      nRBC 0 /100 WBCs      Neutrophils Relative 85 %      Immat GRANS % 0 %      Lymphocytes Relative 7 %      Monocytes Relative 7 %      Eosinophils Relative 1 %      Basophils Relative 0 %      Neutrophils Absolute 4 38 Thousands/µL      Immature Grans Absolute 0 01 Thousand/uL      Lymphocytes Absolute 0 38 Thousands/µL      Monocytes Absolute 0 34 Thousand/µL      Eosinophils Absolute 0 06 Thousand/µL      Basophils Absolute 0 02 Thousands/µL                  CT abdomen pelvis with contrast   Final Result by Sai Albrecht MD (03/26 1752)   Scattered regions of small bowel (including terminal ileum) and splenic flexure wall thickening and inflammatory change consistent with uncomplicated Crohn's disease as provided in the clinical history  Bilateral nonobstructive nephrolithiasis  Workstation performed: [id number]                    Procedures  Procedures         ED Course  ED Course as of Mar 26 1807   Thu Mar 26, 2020   1642 Trending up per patient report   Hemoglobin(!): 7 9   1759 Discussed case with GI on call Dr Jai Marcelino, agrees with outpatient management  Recommends 40mg prednisone w/ weekly taper over 1 month  Pt is agreeable                                    MDM  Number of Diagnoses or Management Options  Acute Crohn's disease with rectal bleeding [facility]):   Diagnosis management comments: Patient's hemoglobin is stable and does not warrant transfusion at this time  CT shows no acute complications of acute Crohn's disease    Case was discussed in consultation with Gastroenterology on-call, patient be discharged home on prednisone taper x1 month with outpatient GI follow-up next week       Amount and/or Complexity of Data Reviewed  Clinical lab tests: ordered and reviewed  Tests in the radiology section of CPT®: ordered and reviewed  Tests in the medicine section of CPT®: ordered and reviewed  Review and summarize past medical records: yes  Discuss the patient with other providers: yes  Independent visualization of images, tracings, or specimens: yes          Disposition  Final diagnoses:   Acute Crohn's disease with rectal bleeding ([address] )     Time reflects when diagnosis was documented in both MDM as applicable and the Disposition within this note     Time User Action Codes Description Comment    3/26/2020  6:02 PM Marco Chakraborty Add [K50 911] Acute Crohn's disease with rectal bleeding [facility])       ED Disposition     ED Disposition Condition Date/Time Comment    Discharge Stable u Mar 26, 2020  6:04 PM Octavio Peter discharge to home/self care  Follow-up Information     Follow up With Specialties Details Why Contact Info    Lara Freire MD Gastroenterology   [address] [address]  [phone]            Patient's Medications   Discharge Prescriptions    DICYCLOMINE (BENTYL) 20 MG TABLET    Take 1 tablet (20 mg total) by mouth every 6 (six) hours       Start Date: 3/26/2020 End Date: --       Order Dose: 20 mg       Quantity: 20 tablet    Refills: 0    DOCUSATE SODIUM (COLACE) 100 MG CAPSULE    Take 1 capsule (100 mg total) by mouth 3 (three) times a day as needed for constipation       Start Date: 3/26/2020 End Date: --       Order Dose: 100 mg       Quantity: 60 capsule    Refills: 0    PREDNISONE 20 MG TABLET    40mg PO qd x 1 week, then 30mg PO qd x 1 week, then 20mg PO qd x1 week, then 10mg PO qd x 1 week       Start Date: 3/26/2020 End Date: --       Order Dose: --       Quantity: 70 tablet    Refills: 0     No discharge procedures on file      PDMP Review     None          ED Provider  Electronically Signed by           Mayte Mauricio PA-C  03/26/20 2107

## 2020-03-30 ENCOUNTER — OFFICE VISIT (OUTPATIENT)
Dept: GASTROENTEROLOGY | Facility: CLINIC | Age: 30
End: 2020-03-30
Payer: COMMERCIAL

## 2020-03-30 VITALS
SYSTOLIC BLOOD PRESSURE: 110 MMHG | HEIGHT: 62 IN | DIASTOLIC BLOOD PRESSURE: 80 MMHG | BODY MASS INDEX: 20.24 KG/M2 | WEIGHT: 110 LBS

## 2020-03-30 DIAGNOSIS — F19.11 HISTORY OF DRUG ABUSE IN REMISSION (HCC): ICD-10-CM

## 2020-03-30 DIAGNOSIS — K50.111 CROHN'S DISEASE OF COLON WITH RECTAL BLEEDING (HCC): Primary | ICD-10-CM

## 2020-03-30 PROCEDURE — 99244 OFF/OP CNSLTJ NEW/EST MOD 40: CPT | Performed by: INTERNAL MEDICINE

## 2020-03-30 NOTE — LETTER
March 30, 2020     Deborah Angulo   [address] [facility]  [address]    Patient: Wilfrid Peterson   YOB: 1990   Date of Visit: 3/30/2020       Dear Dr Keo Wall: Thank you for referring Wilfrid Peterson to me for evaluation  Below are my notes for this consultation  If you have questions, please do not hesitate to call me  I look forward to following your patient along with you  Sincerely,        Roberto Martinez MD        CC: No Recipients  Roberto Martinez MD  3/30/2020  8:37 AM  Sign at close encounter  [address] [facility] Outpatient Consultation  Wilfrid Peterson 26 y o  female MRN: [id number]  Encounter: [id number]          ASSESSMENT AND PLAN:      1  Crohn's disease of colon with rectal bleeding ([address] )  Long history of Crohn's disease on multiple medical regimens  On nothing over the last 2 years  But probably active disease secondary persistent iron deficiency anemia  Now presents with increasing abdominal pain and rectal bleeding   - Hepatitis B surface antibody; Future  - Hepatitis B surface antigen; Future  - Quantiferon TB Gold Plus ([facility]); Future  - continue prednisone 20 mg daily for a week and then decrease to 10 mg daily until colonoscopy  - long discussion with her about natural history of Crohn's disease and that she will need to be on some medication to keep her in remission (Entyvio versus Stelara?)    2  History of drug abuse in remission [facility])  Has been clean over 7 years    3  Iron deficiency anemia  Probably secondary to chronic GI bleeding related to Crohn's disease    ______________________________________________________________________    HPI:  Patient is seen in the office after a long absence  She has a history of Crohn's disease which was diagnosed at age 11  She had been on multiple medications which have either not worked, had side effects to, or did not take because of concerns of side effects    She also had a history of narcotic dependence/tolerance which off to confound that her clinical presentation and  She had been evaluated by Dr Daphne oRwe at [facility]  She had been off all medications since 2018 and treating herself with a low gluten low-fiber diet  She reports that she has been moving her bowels regularly without any abdominal pain  Over the last several weeks she has had increasing issues with constipation and right lower quadrant abdominal pain  She also began seeing rectal bleeding  She denies any diarrhea  She denies any upper GI symptoms  Her weight was down a couple lb but for the most part has been stable  She presented to the emergency room  She is found have a hemoglobin of 7 9 and albumin 2 6  A CT scan with areas of thickened small an and large bowel  She was placed on prednisone 20 mg daily  She reports that has helped the abdominal pain and the constipation  She has also not seen any further rectal bleeding  She denies any fevers or chills  REVIEW OF SYSTEMS:    CONSTITUTIONAL: Denies any fever, chills or rigors  But admits to fatigue and weight loss  HEENT: No earache or tinnitus  Denies hearing loss or visual disturbances  CARDIOVASCULAR: No chest pain or palpitations  RESPIRATORY: Denies any cough, hemoptysis, shortness of breath or dyspnea on exertion  GASTROINTESTINAL: As noted in the History of Present Illness  GENITOURINARY: No problems with urination  Denies any hematuria or dysuria  NEUROLOGIC: No dizziness or vertigo, denies headaches  MUSCULOSKELETAL:  Complains of low back pain that she is seeing physical therapy for  SKIN: Denies skin rashes or itching  ENDOCRINE: Denies excessive thirst  Denies intolerance to heat or cold  PSYCHOSOCIAL: Denies depression or anxiety  Denies any recent memory loss  Historical Information   Past Medical History:   Diagnosis Date    Crohn disease ([facility] [address] )      No past surgical history on file    Social History Social History     Substance and Sexual Activity   Alcohol Use Never    Frequency: Never     Social History     Substance and Sexual Activity   Drug Use No    Comment: hx of percocet and dilaudid abuse     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    Social History     Tobacco Use   Smoking Status Former Smoker    Types: Cigarettes   Smokeless Tobacco Never Used     No family history on file  Meds/Allergies       Current Outpatient Medications:     buprenorphine (SUBUTEX) 8 mg    dicyclomine (BENTYL) 20 mg tablet    docusate sodium (COLACE) 100 mg capsule    ibuprofen (MOTRIN) 200 mg tablet    predniSONE 20 mg tablet    Allergies   Allergen Reactions    Gabapentin     Vancomycin Hives           Objective     Blood pressure 110/80, height 5' 2" (1 575 m), weight 49 9 kg (110 lb)  Body mass index is 20 12 kg/m²  PHYSICAL EXAM:      General Appearance:   Alert, cooperative, no distress   HEENT:   Normocephalic, atraumatic, anicteric      Neck:  Supple, symmetrical, trachea midline   Lungs:   Clear to auscultation bilaterally; no rales, rhonchi or wheezing; respirations unlabored    Heart[de-identified]   Regular rate and rhythm; no murmur, rub, or gallop  Abdomen:   Soft, non-tender, non-distended; normal bowel sounds; no masses, no organomegaly    Genitalia:   Deferred    Rectal:   Deferred    Extremities:  No cyanosis, clubbing or edema    Pulses:  2+ and symmetric    Skin:  No jaundice, rashes, or lesions    Lymph nodes:  No palpable cervical lymphadenopathy        Lab Results:   No visits with results within 1 Day(s) from this visit     Latest known visit with results is:   Admission on 03/26/2020, Discharged on 03/26/2020   Component Date Value    WBC 03/26/2020 5 19     RBC 03/26/2020 3 78*    Hemoglobin 03/26/2020 7 9*    Hematocrit 03/26/2020 30 0*    MCV 03/26/2020 79*    MCH 03/26/2020 20 9*    MCHC 03/26/2020 26 3*    RDW 03/26/2020 28 3*    MPV 03/26/2020 9 6     Platelets 17/38/8035 310     nRBC 03/26/2020 0     Neutrophils Relative 03/26/2020 85*    Immat GRANS % 03/26/2020 0     Lymphocytes Relative 03/26/2020 7*    Monocytes Relative 03/26/2020 7     Eosinophils Relative 03/26/2020 1     Basophils Relative 03/26/2020 0     Neutrophils Absolute 03/26/2020 4 38     Immature Grans Absolute 03/26/2020 0 01     Lymphocytes Absolute 03/26/2020 0 38*    Monocytes Absolute 03/26/2020 0 34     Eosinophils Absolute 03/26/2020 0 06     Basophils Absolute 03/26/2020 0 02     Sodium 03/26/2020 139     Potassium 03/26/2020 3 8     Chloride 03/26/2020 104     CO2 03/26/2020 30     ANION GAP 03/26/2020 5     BUN 03/26/2020 10     Creatinine 03/26/2020 1 08     Glucose 03/26/2020 92     Calcium 03/26/2020 8 4     AST 03/26/2020 17     ALT 03/26/2020 14     Alkaline Phosphatase 03/26/2020 75     Total Protein 03/26/2020 6 3*    Albumin 03/26/2020 2 6*    Total Bilirubin 03/26/2020 0 20     eGFR 03/26/2020 69     ABO Grouping 03/26/2020 A     Rh Factor 03/26/2020 Positive     Antibody Screen 03/26/2020 Negative     Specimen Expiration Date 03/26/2020 09121544     Color, UA 03/26/2020 yellow     Clarity, UA 03/26/2020 cloudy     Glucose, UA (Ref: Negati* 03/26/2020 neg     Bilirubin, UA (Ref: Nega* 03/26/2020 neg     Ketones, UA (Ref: Negati* 03/26/2020 neg     Spec Grav, UA (Ref:1 003* 03/26/2020 1 020     Blood, UA (Ref: Negative) 03/26/2020 neg     pH, UA (Ref: 4 5-8 0) 03/26/2020 6     Protein, UA (Ref: Negati* 03/26/2020 trace     Urobilinogen, UA (Ref: 0* 03/26/2020 0 2      Leukocytes, UA (Ref: Ne* 03/26/2020 neg     Nitrite, UA (Ref: Negati* 03/26/2020 neg     EXT PREG TEST UR (Ref: N* 03/26/2020 negative     Control 03/26/2020 valid     ABO Grouping 03/26/2020 A     Rh Factor 03/26/2020 Positive          Radiology Results:   Ct Abdomen Pelvis With Contrast    Result Date: 3/26/2020  Narrative: CT ABDOMEN AND PELVIS WITH IV CONTRAST INDICATION:   abd pain, hematochezia, hx of Crohn's  COMPARISON:  9/10/2017 TECHNIQUE:  CT examination of the abdomen and pelvis was performed  Axial, sagittal, and coronal 2D reformatted images were created from the source data and submitted for interpretation  Radiation dose length product (DLP) for this visit:  431 mGy-cm   This examination, like all CT scans performed in the [facility], was performed utilizing techniques to minimize radiation dose exposure, including the use of iterative reconstruction and automated exposure control  IV Contrast:  100 mL of iohexol (OMNIPAQUE) Enteric Contrast:  Enteric contrast was not administered  FINDINGS: ABDOMEN LOWER CHEST:  No clinically significant abnormality identified in the visualized lower chest  LIVER/BILIARY TREE:  Unremarkable  GALLBLADDER:  No calcified gallstones  No pericholecystic inflammatory change  SPLEEN:  Unremarkable  PANCREAS:  Unremarkable  ADRENAL GLANDS:  Unremarkable  KIDNEYS/URETERS:  Bilateral nonobstructive nephrolithiasis  Lobulated appearance and mild atrophy involving the right kidney similar to prior study  STOMACH AND BOWEL:  Splenic flexure focal colonic wall thickening and mild inflammatory change  Terminal ileum also demonstrates wall thickening and minimal inflammatory change with additional scattered small bowel loops with similar findings  APPENDIX:  A normal appendix was visualized  ABDOMINOPELVIC CAVITY:  There is a small volume of free pelvic fluid, likely physiologic given the patient's age and gender  No free intraperitoneal air  No lymphadenopathy  VESSELS:  Unremarkable for patient's age  PELVIS REPRODUCTIVE ORGANS:  Unremarkable for patient's age  URINARY BLADDER:  Unremarkable  ABDOMINAL WALL/INGUINAL REGIONS:  Unremarkable  OSSEOUS STRUCTURES:  No acute fracture or destructive osseous lesion       Impression: Scattered regions of small bowel (including terminal ileum) and splenic flexure wall thickening and inflammatory change consistent with uncomplicated Crohn's disease as provided in the clinical history  Bilateral nonobstructive nephrolithiasis   Workstation performed: [id number]

## 2020-03-30 NOTE — PROGRESS NOTES
[facility] - Outpatient Consultation  Michael Jesus 26 y o  female MRN: [id number]  Encounter: [id number]          ASSESSMENT AND PLAN:      1  Crohn's disease of colon with rectal bleeding ([address] )  Long history of Crohn's disease on multiple medical regimens  On nothing over the last 2 years  But probably active disease secondary persistent iron deficiency anemia  Now presents with increasing abdominal pain and rectal bleeding   - Hepatitis B surface antibody; Future  - Hepatitis B surface antigen; Future  - Quantiferon TB Gold Plus ([facility]); Future  - continue prednisone 20 mg daily for a week and then decrease to 10 mg daily until colonoscopy  - long discussion with her about natural history of Crohn's disease and that she will need to be on some medication to keep her in remission (Entyvio versus Stelara?)  - colonoscopy    2  History of drug abuse in remission [facility])  Has been clean over 7 years    3  Iron deficiency anemia  Probably secondary to chronic GI bleeding related to Crohn's disease    ______________________________________________________________________    HPI:  Patient is seen in the office after a long absence  She has a history of Crohn's disease which was diagnosed at age 11  She had been on multiple medications which have either not worked, had side effects to, or did not take because of concerns of side effects  She also had a history of narcotic dependence/tolerance which off to confound that her clinical presentation and  She had been evaluated by Dr Dale Kiran at [facility]  She had been off all medications since 2018 and treating herself with a low gluten low-fiber diet  She reports that she has been moving her bowels regularly without any abdominal pain  Over the last several weeks she has had increasing issues with constipation and right lower quadrant abdominal pain  She also began seeing rectal bleeding  She denies any diarrhea    She denies any upper GI symptoms  Her weight was down a couple lb but for the most part has been stable  She presented to the emergency room  She is found have a hemoglobin of 7 9 and albumin 2 6  A CT scan with areas of thickened small an and large bowel  She was placed on prednisone 20 mg daily  She reports that has helped the abdominal pain and the constipation  She has also not seen any further rectal bleeding  She denies any fevers or chills  REVIEW OF SYSTEMS:    CONSTITUTIONAL: Denies any fever, chills or rigors  But admits to fatigue and weight loss  HEENT: No earache or tinnitus  Denies hearing loss or visual disturbances  CARDIOVASCULAR: No chest pain or palpitations  RESPIRATORY: Denies any cough, hemoptysis, shortness of breath or dyspnea on exertion  GASTROINTESTINAL: As noted in the History of Present Illness  GENITOURINARY: No problems with urination  Denies any hematuria or dysuria  NEUROLOGIC: No dizziness or vertigo, denies headaches  MUSCULOSKELETAL:  Complains of low back pain that she is seeing physical therapy for  SKIN: Denies skin rashes or itching  ENDOCRINE: Denies excessive thirst  Denies intolerance to heat or cold  PSYCHOSOCIAL: Denies depression or anxiety  Denies any recent memory loss  Historical Information   Past Medical History:   Diagnosis Date    Crohn disease ([facility] [address] )      No past surgical history on file  Social History   Social History     Substance and Sexual Activity   Alcohol Use Never    Frequency: Never     Social History     Substance and Sexual Activity   Drug Use No    Comment: hx of percocet and dilaudid abuse     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    Social History     Tobacco Use   Smoking Status Former Smoker    Types: Cigarettes   Smokeless Tobacco Never Used     No family history on file      Meds/Allergies       Current Outpatient Medications:     buprenorphine (SUBUTEX) 8 mg    dicyclomine (BENTYL) 20 mg tablet    docusate sodium (COLACE) 100 mg capsule    ibuprofen (MOTRIN) 200 mg tablet    predniSONE 20 mg tablet    Allergies   Allergen Reactions    Gabapentin     Vancomycin Hives           Objective     Blood pressure 110/80, height 5' 2" (1 575 m), weight 49 9 kg (110 lb)  Body mass index is 20 12 kg/m²  PHYSICAL EXAM:      General Appearance:   Alert, cooperative, no distress   HEENT:   Normocephalic, atraumatic, anicteric      Neck:  Supple, symmetrical, trachea midline   Lungs:   Clear to auscultation bilaterally; no rales, rhonchi or wheezing; respirations unlabored    Heart[de-identified]   Regular rate and rhythm; no murmur, rub, or gallop  Abdomen:   Soft, non-tender, non-distended; normal bowel sounds; no masses, no organomegaly    Genitalia:   Deferred    Rectal:   Deferred    Extremities:  No cyanosis, clubbing or edema    Pulses:  2+ and symmetric    Skin:  No jaundice, rashes, or lesions    Lymph nodes:  No palpable cervical lymphadenopathy        Lab Results:   No visits with results within 1 Day(s) from this visit     Latest known visit with results is:   Admission on 03/26/2020, Discharged on 03/26/2020   Component Date Value    WBC 03/26/2020 5 19     RBC 03/26/2020 3 78*    Hemoglobin 03/26/2020 7 9*    Hematocrit 03/26/2020 30 0*    MCV 03/26/2020 79*    MCH 03/26/2020 20 9*    MCHC 03/26/2020 26 3*    RDW 03/26/2020 28 3*    MPV 03/26/2020 9 6     Platelets 75/91/5457 310     nRBC 03/26/2020 0     Neutrophils Relative 03/26/2020 85*    Immat GRANS % 03/26/2020 0     Lymphocytes Relative 03/26/2020 7*    Monocytes Relative 03/26/2020 7     Eosinophils Relative 03/26/2020 1     Basophils Relative 03/26/2020 0     Neutrophils Absolute 03/26/2020 4 38     Immature Grans Absolute 03/26/2020 0 01     Lymphocytes Absolute 03/26/2020 0 38*    Monocytes Absolute 03/26/2020 0 34     Eosinophils Absolute 03/26/2020 0 06     Basophils Absolute 03/26/2020 0 02     Sodium 03/26/2020 139     Potassium 03/26/2020 3 8     Chloride 03/26/2020 104     CO2 03/26/2020 30     ANION GAP 03/26/2020 5     BUN 03/26/2020 10     Creatinine 03/26/2020 1 08     Glucose 03/26/2020 92     Calcium 03/26/2020 8 4     AST 03/26/2020 17     ALT 03/26/2020 14     Alkaline Phosphatase 03/26/2020 75     Total Protein 03/26/2020 6 3*    Albumin 03/26/2020 2 6*    Total Bilirubin 03/26/2020 0 20     eGFR 03/26/2020 69     ABO Grouping 03/26/2020 A     Rh Factor 03/26/2020 Positive     Antibody Screen 03/26/2020 Negative     Specimen Expiration Date 03/26/2020 [id number]     Color, UA 03/26/2020 yellow     Clarity, UA 03/26/2020 cloudy     Glucose, UA (Ref: Negati* 03/26/2020 neg     Bilirubin, UA (Ref: Nega* 03/26/2020 neg     Ketones, UA (Ref: Negati* 03/26/2020 neg     Spec Grav, UA (Ref:1 003* 03/26/2020 1 020     Blood, UA (Ref: Negative) 03/26/2020 neg     pH, UA (Ref: 4 5-8 0) 03/26/2020 6     Protein, UA (Ref: Negati* 03/26/2020 trace     Urobilinogen, UA (Ref: 0* 03/26/2020 0 2      Leukocytes, UA (Ref: Ne* 03/26/2020 neg     Nitrite, UA (Ref: Negati* 03/26/2020 neg     EXT PREG TEST UR (Ref: N* 03/26/2020 negative     Control 03/26/2020 valid     ABO Grouping 03/26/2020 A     Rh Factor 03/26/2020 Positive          Radiology Results:   Ct Abdomen Pelvis With Contrast    Result Date: 3/26/2020  Narrative: CT ABDOMEN AND PELVIS WITH IV CONTRAST INDICATION:   abd pain, hematochezia, hx of Crohn's  COMPARISON:  9/10/2017 TECHNIQUE:  CT examination of the abdomen and pelvis was performed  Axial, sagittal, and coronal 2D reformatted images were created from the source data and submitted for interpretation  Radiation dose length product (DLP) for this visit:  431 mGy-cm   This examination, like all CT scans performed in the [facility], was performed utilizing techniques to minimize radiation dose exposure, including the use of iterative reconstruction and automated exposure control   IV Contrast:  100 mL of iohexol (OMNIPAQUE) Enteric Contrast:  Enteric contrast was not administered  FINDINGS: ABDOMEN LOWER CHEST:  No clinically significant abnormality identified in the visualized lower chest  LIVER/BILIARY TREE:  Unremarkable  GALLBLADDER:  No calcified gallstones  No pericholecystic inflammatory change  SPLEEN:  Unremarkable  PANCREAS:  Unremarkable  ADRENAL GLANDS:  Unremarkable  KIDNEYS/URETERS:  Bilateral nonobstructive nephrolithiasis  Lobulated appearance and mild atrophy involving the right kidney similar to prior study  STOMACH AND BOWEL:  Splenic flexure focal colonic wall thickening and mild inflammatory change  Terminal ileum also demonstrates wall thickening and minimal inflammatory change with additional scattered small bowel loops with similar findings  APPENDIX:  A normal appendix was visualized  ABDOMINOPELVIC CAVITY:  There is a small volume of free pelvic fluid, likely physiologic given the patient's age and gender  No free intraperitoneal air  No lymphadenopathy  VESSELS:  Unremarkable for patient's age  PELVIS REPRODUCTIVE ORGANS:  Unremarkable for patient's age  URINARY BLADDER:  Unremarkable  ABDOMINAL WALL/INGUINAL REGIONS:  Unremarkable  OSSEOUS STRUCTURES:  No acute fracture or destructive osseous lesion  Impression: Scattered regions of small bowel (including terminal ileum) and splenic flexure wall thickening and inflammatory change consistent with uncomplicated Crohn's disease as provided in the clinical history  Bilateral nonobstructive nephrolithiasis   Workstation performed: [id number]

## 2020-03-30 NOTE — LETTER
March 30, 2020     Deni Jimenez   [address],[facility]  [address]    Patient: Maria De Jesus Ruiz   YOB: 1990   Date of Visit: 3/30/2020       Dear Dr Lima November: Thank you for referring Maria De Jesus Ruiz to me for evaluation  Below are my notes for this consultation  If you have questions, please do not hesitate to call me  I look forward to following your patient along with you  Sincerely,        Lei Like Karyle Aland, MD        CC: No Recipients  Lei Like Karyle Aland, MD  3/30/2020  8:38 AM  Sign at close encounter  [facility] - Outpatient Consultation  Maria De Jesus Ruiz 26 y o  female MRN: [id number]  Encounter: [id number]          ASSESSMENT AND PLAN:      1  Crohn's disease of colon with rectal bleeding ([address] )  Long history of Crohn's disease on multiple medical regimens  On nothing over the last 2 years  But probably active disease secondary persistent iron deficiency anemia  Now presents with increasing abdominal pain and rectal bleeding   - Hepatitis B surface antibody; Future  - Hepatitis B surface antigen; Future  - Quantiferon TB Gold Plus ([facility]); Future  - continue prednisone 20 mg daily for a week and then decrease to 10 mg daily until colonoscopy  - long discussion with her about natural history of Crohn's disease and that she will need to be on some medication to keep her in remission (Entyvio versus Stelara?)  - colonoscopy    2  History of drug abuse in remission [facility])  Has been clean over 7 years    3  Iron deficiency anemia  Probably secondary to chronic GI bleeding related to Crohn's disease    ______________________________________________________________________    HPI:  Patient is seen in the office after a long absence  She has a history of Crohn's disease which was diagnosed at age 11   She had been on multiple medications which have either not worked, had side effects to, or did not take because of concerns of side effects  She also had a history of narcotic dependence/tolerance which off to confound that her clinical presentation and  She had been evaluated by Dr Andre Cardoza at [facility]  She had been off all medications since 2018 and treating herself with a low gluten low-fiber diet  She reports that she has been moving her bowels regularly without any abdominal pain  Over the last several weeks she has had increasing issues with constipation and right lower quadrant abdominal pain  She also began seeing rectal bleeding  She denies any diarrhea  She denies any upper GI symptoms  Her weight was down a couple lb but for the most part has been stable  She presented to the emergency room  She is found have a hemoglobin of 7 9 and albumin 2 6  A CT scan with areas of thickened small an and large bowel  She was placed on prednisone 20 mg daily  She reports that has helped the abdominal pain and the constipation  She has also not seen any further rectal bleeding  She denies any fevers or chills  REVIEW OF SYSTEMS:    CONSTITUTIONAL: Denies any fever, chills or rigors  But admits to fatigue and weight loss  HEENT: No earache or tinnitus  Denies hearing loss or visual disturbances  CARDIOVASCULAR: No chest pain or palpitations  RESPIRATORY: Denies any cough, hemoptysis, shortness of breath or dyspnea on exertion  GASTROINTESTINAL: As noted in the History of Present Illness  GENITOURINARY: No problems with urination  Denies any hematuria or dysuria  NEUROLOGIC: No dizziness or vertigo, denies headaches  MUSCULOSKELETAL:  Complains of low back pain that she is seeing physical therapy for  SKIN: Denies skin rashes or itching  ENDOCRINE: Denies excessive thirst  Denies intolerance to heat or cold  PSYCHOSOCIAL: Denies depression or anxiety  Denies any recent memory loss         Historical Information   Past Medical History:   Diagnosis Date    Crohn disease ([facility] [address] )      No past surgical history on file   Social History   Social History     Substance and Sexual Activity   Alcohol Use Never    Frequency: Never     Social History     Substance and Sexual Activity   Drug Use No    Comment: hx of percocet and dilaudid abuse     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    Social History     Tobacco Use   Smoking Status Former Smoker    Types: Cigarettes   Smokeless Tobacco Never Used     No family history on file  Meds/Allergies       Current Outpatient Medications:     buprenorphine (SUBUTEX) 8 mg    dicyclomine (BENTYL) 20 mg tablet    docusate sodium (COLACE) 100 mg capsule    ibuprofen (MOTRIN) 200 mg tablet    predniSONE 20 mg tablet    Allergies   Allergen Reactions    Gabapentin     Vancomycin Hives           Objective     Blood pressure 110/80, height 5' 2" (1 575 m), weight 49 9 kg (110 lb)  Body mass index is 20 12 kg/m²  PHYSICAL EXAM:      General Appearance:   Alert, cooperative, no distress   HEENT:   Normocephalic, atraumatic, anicteric      Neck:  Supple, symmetrical, trachea midline   Lungs:   Clear to auscultation bilaterally; no rales, rhonchi or wheezing; respirations unlabored    Heart[de-identified]   Regular rate and rhythm; no murmur, rub, or gallop  Abdomen:   Soft, non-tender, non-distended; normal bowel sounds; no masses, no organomegaly    Genitalia:   Deferred    Rectal:   Deferred    Extremities:  No cyanosis, clubbing or edema    Pulses:  2+ and symmetric    Skin:  No jaundice, rashes, or lesions    Lymph nodes:  No palpable cervical lymphadenopathy        Lab Results:   No visits with results within 1 Day(s) from this visit     Latest known visit with results is:   Admission on 03/26/2020, Discharged on 03/26/2020   Component Date Value    WBC 03/26/2020 5 19     RBC 03/26/2020 3 78*    Hemoglobin 03/26/2020 7 9*    Hematocrit 03/26/2020 30 0*    MCV 03/26/2020 79*    MCH 03/26/2020 20 9*    MCHC 03/26/2020 26 3*    RDW 03/26/2020 28 3*    MPV 03/26/2020 9 6     Platelets 03/26/2020 310     nRBC 03/26/2020 0     Neutrophils Relative 03/26/2020 85*    Immat GRANS % 03/26/2020 0     Lymphocytes Relative 03/26/2020 7*    Monocytes Relative 03/26/2020 7     Eosinophils Relative 03/26/2020 1     Basophils Relative 03/26/2020 0     Neutrophils Absolute 03/26/2020 4 38     Immature Grans Absolute 03/26/2020 0 01     Lymphocytes Absolute 03/26/2020 0 38*    Monocytes Absolute 03/26/2020 0 34     Eosinophils Absolute 03/26/2020 0 06     Basophils Absolute 03/26/2020 0 02     Sodium 03/26/2020 139     Potassium 03/26/2020 3 8     Chloride 03/26/2020 104     CO2 03/26/2020 30     ANION GAP 03/26/2020 5     BUN 03/26/2020 10     Creatinine 03/26/2020 1 08     Glucose 03/26/2020 92     Calcium 03/26/2020 8 4     AST 03/26/2020 17     ALT 03/26/2020 14     Alkaline Phosphatase 03/26/2020 75     Total Protein 03/26/2020 6 3*    Albumin 03/26/2020 2 6*    Total Bilirubin 03/26/2020 0 20     eGFR 03/26/2020 69     ABO Grouping 03/26/2020 A     Rh Factor 03/26/2020 Positive     Antibody Screen 03/26/2020 Negative     Specimen Expiration Date 03/26/2020 [id number]     Color, UA 03/26/2020 yellow     Clarity, UA 03/26/2020 cloudy     Glucose, UA (Ref: Negati* 03/26/2020 neg     Bilirubin, UA (Ref: Nega* 03/26/2020 neg     Ketones, UA (Ref: Negati* 03/26/2020 neg     Spec Grav, UA (Ref:1 003* 03/26/2020 1 020     Blood, UA (Ref: Negative) 03/26/2020 neg     pH, UA (Ref: 4 5-8 0) 03/26/2020 6     Protein, UA (Ref: Negati* 03/26/2020 trace     Urobilinogen, UA (Ref: 0* 03/26/2020 0 2      Leukocytes, UA (Ref: Ne* 03/26/2020 neg     Nitrite, UA (Ref: Negati* 03/26/2020 neg     EXT PREG TEST UR (Ref: N* 03/26/2020 negative     Control 03/26/2020 valid     ABO Grouping 03/26/2020 A     Rh Factor 03/26/2020 Positive          Radiology Results:   Ct Abdomen Pelvis With Contrast    Result Date: 3/26/2020  Narrative: CT ABDOMEN AND PELVIS WITH IV CONTRAST INDICATION: abd pain, hematochezia, hx of Crohn's  COMPARISON:  9/10/2017 TECHNIQUE:  CT examination of the abdomen and pelvis was performed  Axial, sagittal, and coronal 2D reformatted images were created from the source data and submitted for interpretation  Radiation dose length product (DLP) for this visit:  431 mGy-cm   This examination, like all CT scans performed in the [facility], was performed utilizing techniques to minimize radiation dose exposure, including the use of iterative reconstruction and automated exposure control  IV Contrast:  100 mL of iohexol (OMNIPAQUE) Enteric Contrast:  Enteric contrast was not administered  FINDINGS: ABDOMEN LOWER CHEST:  No clinically significant abnormality identified in the visualized lower chest  LIVER/BILIARY TREE:  Unremarkable  GALLBLADDER:  No calcified gallstones  No pericholecystic inflammatory change  SPLEEN:  Unremarkable  PANCREAS:  Unremarkable  ADRENAL GLANDS:  Unremarkable  KIDNEYS/URETERS:  Bilateral nonobstructive nephrolithiasis  Lobulated appearance and mild atrophy involving the right kidney similar to prior study  STOMACH AND BOWEL:  Splenic flexure focal colonic wall thickening and mild inflammatory change  Terminal ileum also demonstrates wall thickening and minimal inflammatory change with additional scattered small bowel loops with similar findings  APPENDIX:  A normal appendix was visualized  ABDOMINOPELVIC CAVITY:  There is a small volume of free pelvic fluid, likely physiologic given the patient's age and gender  No free intraperitoneal air  No lymphadenopathy  VESSELS:  Unremarkable for patient's age  PELVIS REPRODUCTIVE ORGANS:  Unremarkable for patient's age  URINARY BLADDER:  Unremarkable  ABDOMINAL WALL/INGUINAL REGIONS:  Unremarkable  OSSEOUS STRUCTURES:  No acute fracture or destructive osseous lesion       Impression: Scattered regions of small bowel (including terminal ileum) and splenic flexure wall thickening and inflammatory change consistent with uncomplicated Crohn's disease as provided in the clinical history  Bilateral nonobstructive nephrolithiasis   Workstation performed: [id number]

## 2020-04-06 ENCOUNTER — ANESTHESIA EVENT (OUTPATIENT)
Dept: GASTROENTEROLOGY | Facility: HOSPITAL | Age: 30
End: 2020-04-06

## 2020-04-08 RX ORDER — SERTRALINE HYDROCHLORIDE 100 MG/1
150 TABLET, FILM COATED ORAL DAILY
COMMUNITY

## 2020-04-10 ENCOUNTER — HOSPITAL ENCOUNTER (OUTPATIENT)
Dept: GASTROENTEROLOGY | Facility: HOSPITAL | Age: 30
Setting detail: OUTPATIENT SURGERY
Discharge: HOME | End: 2020-04-10
Attending: INTERNAL MEDICINE | Admitting: INTERNAL MEDICINE
Payer: COMMERCIAL

## 2020-04-10 ENCOUNTER — ANESTHESIA (OUTPATIENT)
Dept: GASTROENTEROLOGY | Facility: HOSPITAL | Age: 30
End: 2020-04-10

## 2020-04-10 VITALS
BODY MASS INDEX: 20.24 KG/M2 | TEMPERATURE: 97.6 F | HEIGHT: 62 IN | HEART RATE: 58 BPM | OXYGEN SATURATION: 99 % | SYSTOLIC BLOOD PRESSURE: 130 MMHG | DIASTOLIC BLOOD PRESSURE: 76 MMHG | RESPIRATION RATE: 20 BRPM | WEIGHT: 110 LBS

## 2020-04-10 DIAGNOSIS — K50.111 CROHN'S DISEASE OF COLON WITH RECTAL BLEEDING (HCC): ICD-10-CM

## 2020-04-10 LAB
EXT PREGNANCY TEST URINE: NEGATIVE
EXT. CONTROL: NORMAL

## 2020-04-10 PROCEDURE — 45380 COLONOSCOPY AND BIOPSY: CPT | Performed by: INTERNAL MEDICINE

## 2020-04-10 PROCEDURE — 88305 TISSUE EXAM BY PATHOLOGIST: CPT | Performed by: PATHOLOGY

## 2020-04-10 PROCEDURE — 81025 URINE PREGNANCY TEST: CPT | Performed by: INTERNAL MEDICINE

## 2020-04-10 RX ORDER — LABETALOL 20 MG/4 ML (5 MG/ML) INTRAVENOUS SYRINGE
5 AS NEEDED
Status: DISCONTINUED | OUTPATIENT
Start: 2020-04-10 | End: 2020-04-14 | Stop reason: HOSPADM

## 2020-04-10 RX ORDER — SODIUM CHLORIDE 9 MG/ML
75 INJECTION, SOLUTION INTRAVENOUS CONTINUOUS
Status: DISCONTINUED | OUTPATIENT
Start: 2020-04-10 | End: 2020-04-14 | Stop reason: HOSPADM

## 2020-04-10 RX ORDER — PROPOFOL 10 MG/ML
INJECTION, EMULSION INTRAVENOUS AS NEEDED
Status: DISCONTINUED | OUTPATIENT
Start: 2020-04-10 | End: 2020-04-10 | Stop reason: SURG

## 2020-04-10 RX ORDER — MEPERIDINE HYDROCHLORIDE 25 MG/ML
12.5 INJECTION INTRAMUSCULAR; INTRAVENOUS; SUBCUTANEOUS
Status: DISCONTINUED | OUTPATIENT
Start: 2020-04-10 | End: 2020-04-14 | Stop reason: HOSPADM

## 2020-04-10 RX ORDER — FENTANYL CITRATE/PF 50 MCG/ML
25 SYRINGE (ML) INJECTION
Status: DISCONTINUED | OUTPATIENT
Start: 2020-04-10 | End: 2020-04-14 | Stop reason: HOSPADM

## 2020-04-10 RX ORDER — METOCLOPRAMIDE HYDROCHLORIDE 5 MG/ML
10 INJECTION INTRAMUSCULAR; INTRAVENOUS ONCE AS NEEDED
Status: DISCONTINUED | OUTPATIENT
Start: 2020-04-10 | End: 2020-04-14 | Stop reason: HOSPADM

## 2020-04-10 RX ORDER — ONDANSETRON 2 MG/ML
4 INJECTION INTRAMUSCULAR; INTRAVENOUS ONCE AS NEEDED
Status: DISCONTINUED | OUTPATIENT
Start: 2020-04-10 | End: 2020-04-14 | Stop reason: HOSPADM

## 2020-04-10 RX ORDER — SODIUM CHLORIDE 9 MG/ML
INJECTION, SOLUTION INTRAVENOUS CONTINUOUS PRN
Status: DISCONTINUED | OUTPATIENT
Start: 2020-04-10 | End: 2020-04-10 | Stop reason: SURG

## 2020-04-10 RX ORDER — HYDRALAZINE HYDROCHLORIDE 20 MG/ML
5 INJECTION INTRAMUSCULAR; INTRAVENOUS AS NEEDED
Status: DISCONTINUED | OUTPATIENT
Start: 2020-04-10 | End: 2020-04-14 | Stop reason: HOSPADM

## 2020-04-10 RX ORDER — PROMETHAZINE HYDROCHLORIDE 25 MG/ML
6.25 INJECTION, SOLUTION INTRAMUSCULAR; INTRAVENOUS ONCE AS NEEDED
Status: DISCONTINUED | OUTPATIENT
Start: 2020-04-10 | End: 2020-04-14 | Stop reason: HOSPADM

## 2020-04-10 RX ADMIN — SODIUM CHLORIDE: 0.9 INJECTION, SOLUTION INTRAVENOUS at 07:04

## 2020-04-10 RX ADMIN — PROPOFOL 20 MG: 10 INJECTION, EMULSION INTRAVENOUS at 07:37

## 2020-04-10 RX ADMIN — PROPOFOL 20 MG: 10 INJECTION, EMULSION INTRAVENOUS at 07:42

## 2020-04-10 RX ADMIN — SODIUM CHLORIDE 75 ML/HR: 0.9 INJECTION, SOLUTION INTRAVENOUS at 07:05

## 2020-04-10 RX ADMIN — PROPOFOL 20 MG: 10 INJECTION, EMULSION INTRAVENOUS at 07:35

## 2020-04-10 RX ADMIN — PROPOFOL 20 MG: 10 INJECTION, EMULSION INTRAVENOUS at 07:31

## 2020-04-10 RX ADMIN — PROPOFOL 20 MG: 10 INJECTION, EMULSION INTRAVENOUS at 07:47

## 2020-04-10 RX ADMIN — PROPOFOL 50 MG: 10 INJECTION, EMULSION INTRAVENOUS at 07:28

## 2020-04-10 RX ADMIN — PROPOFOL 20 MG: 10 INJECTION, EMULSION INTRAVENOUS at 07:41

## 2020-04-10 RX ADMIN — PROPOFOL 50 MG: 10 INJECTION, EMULSION INTRAVENOUS at 07:26

## 2020-04-10 RX ADMIN — PROPOFOL 20 MG: 10 INJECTION, EMULSION INTRAVENOUS at 07:38

## 2020-04-10 RX ADMIN — PROPOFOL 20 MG: 10 INJECTION, EMULSION INTRAVENOUS at 07:33

## 2020-04-14 ENCOUNTER — TELEPHONE (OUTPATIENT)
Dept: GASTROENTEROLOGY | Facility: CLINIC | Age: 30
End: 2020-04-14

## 2020-04-14 DIAGNOSIS — K50.111 CROHN'S DISEASE OF COLON WITH RECTAL BLEEDING (HCC): Primary | ICD-10-CM

## 2020-04-15 LAB
GAMMA INTERFERON BACKGROUND BLD IA-ACNC: 0.12 IU/ML
HBV SURFACE AB SER-ACNC: 114.6 MIU/ML
HBV SURFACE AG SERPL QL IA: NEGATIVE
M TB IFN-G CD4+ T-CELLS BLD-ACNC: 0.13 IU/ML
M TB IFN-G CD4+ T-CELLS BLD-ACNC: 0.16 IU/ML
MITOGEN IGNF BLD-ACNC: 0.26 IU/ML
QUANTIFERON INCUBATION COMMENT: ABNORMAL
QUANTIFERON-TB GOLD PLUS: ABNORMAL
SERVICE CMNT-IMP: NORMAL

## 2020-04-16 ENCOUNTER — TELEPHONE (OUTPATIENT)
Dept: GASTROENTEROLOGY | Facility: CLINIC | Age: 30
End: 2020-04-16

## 2020-05-14 ENCOUNTER — TELEMEDICINE (OUTPATIENT)
Dept: GASTROENTEROLOGY | Facility: CLINIC | Age: 30
End: 2020-05-14
Payer: COMMERCIAL

## 2020-05-14 VITALS — BODY MASS INDEX: 21.53 KG/M2 | HEIGHT: 62 IN | WEIGHT: 117 LBS

## 2020-05-14 DIAGNOSIS — E61.1 IRON DEFICIENCY: ICD-10-CM

## 2020-05-14 DIAGNOSIS — K50.111 CROHN'S DISEASE OF COLON WITH RECTAL BLEEDING (HCC): Primary | ICD-10-CM

## 2020-05-14 PROCEDURE — G2012 BRIEF CHECK IN BY MD/QHP: HCPCS | Performed by: NURSE PRACTITIONER

## 2020-05-26 ENCOUNTER — TELEPHONE (OUTPATIENT)
Dept: GASTROENTEROLOGY | Facility: CLINIC | Age: 30
End: 2020-05-26

## 2020-05-26 DIAGNOSIS — R10.9 ABDOMINAL CRAMPING: Primary | ICD-10-CM

## 2020-05-26 LAB
GAMMA INTERFERON BACKGROUND BLD IA-ACNC: 0.03 IU/ML
M TB IFN-G CD4+ T-CELLS BLD-ACNC: 0.04 IU/ML
M TB IFN-G CD4+ T-CELLS BLD-ACNC: 0.04 IU/ML
MITOGEN IGNF BLD-ACNC: 0.9 IU/ML
QUANTIFERON INCUBATION COMMENT: NORMAL
QUANTIFERON-TB GOLD PLUS: NEGATIVE
SERVICE CMNT-IMP: NORMAL

## 2020-05-26 RX ORDER — DICYCLOMINE HYDROCHLORIDE 10 MG/1
10 CAPSULE ORAL
Qty: 120 CAPSULE | Refills: 2 | Status: SHIPPED | OUTPATIENT
Start: 2020-05-26 | End: 2020-08-07 | Stop reason: SDUPTHER

## 2020-05-27 ENCOUNTER — TELEPHONE (OUTPATIENT)
Dept: GASTROENTEROLOGY | Facility: CLINIC | Age: 30
End: 2020-05-27

## 2020-05-28 RX ORDER — SODIUM CHLORIDE 9 MG/ML
20 INJECTION, SOLUTION INTRAVENOUS ONCE
Status: CANCELLED | OUTPATIENT
Start: 2020-06-09

## 2020-06-09 ENCOUNTER — HOSPITAL ENCOUNTER (OUTPATIENT)
Dept: INFUSION CENTER | Facility: HOSPITAL | Age: 30
Discharge: HOME | End: 2020-06-09
Attending: INTERNAL MEDICINE
Payer: COMMERCIAL

## 2020-06-09 VITALS
RESPIRATION RATE: 16 BRPM | OXYGEN SATURATION: 98 % | TEMPERATURE: 97.9 F | SYSTOLIC BLOOD PRESSURE: 111 MMHG | DIASTOLIC BLOOD PRESSURE: 79 MMHG | HEART RATE: 76 BPM | BODY MASS INDEX: 22.06 KG/M2 | WEIGHT: 120.59 LBS

## 2020-06-09 DIAGNOSIS — K50.111 CROHN'S DISEASE OF COLON WITH RECTAL BLEEDING (HCC): Primary | ICD-10-CM

## 2020-06-09 PROCEDURE — 96365 THER/PROPH/DIAG IV INF INIT: CPT

## 2020-06-09 RX ORDER — DOXYCYCLINE HYCLATE 20 MG
20 TABLET ORAL DAILY
Status: ON HOLD | COMMUNITY
End: 2020-10-29 | Stop reason: CLARIF

## 2020-06-09 RX ORDER — SODIUM CHLORIDE 9 MG/ML
20 INJECTION, SOLUTION INTRAVENOUS ONCE
Status: CANCELLED | OUTPATIENT
Start: 2020-06-09

## 2020-06-09 RX ORDER — SODIUM CHLORIDE 9 MG/ML
20 INJECTION, SOLUTION INTRAVENOUS ONCE
Status: COMPLETED | OUTPATIENT
Start: 2020-06-09 | End: 2020-06-09

## 2020-06-09 RX ADMIN — USTEKINUMAB 260 MG: 130 SOLUTION INTRAVENOUS at 14:29

## 2020-06-09 RX ADMIN — SODIUM CHLORIDE 20 ML/HR: 9 INJECTION, SOLUTION INTRAVENOUS at 14:28

## 2020-06-29 ENCOUNTER — TELEPHONE (OUTPATIENT)
Dept: GASTROENTEROLOGY | Facility: CLINIC | Age: 30
End: 2020-06-29

## 2020-06-29 ENCOUNTER — TELEMEDICINE (OUTPATIENT)
Dept: GASTROENTEROLOGY | Facility: CLINIC | Age: 30
End: 2020-06-29
Payer: COMMERCIAL

## 2020-06-29 VITALS — BODY MASS INDEX: 20.98 KG/M2 | HEIGHT: 62 IN | WEIGHT: 114 LBS

## 2020-06-29 DIAGNOSIS — D50.9 IRON DEFICIENCY ANEMIA, UNSPECIFIED IRON DEFICIENCY ANEMIA TYPE: ICD-10-CM

## 2020-06-29 DIAGNOSIS — K50.111 CROHN'S DISEASE OF COLON WITH RECTAL BLEEDING (HCC): Primary | ICD-10-CM

## 2020-06-29 PROCEDURE — G2012 BRIEF CHECK IN BY MD/QHP: HCPCS | Performed by: NURSE PRACTITIONER

## 2020-06-29 RX ORDER — FERROUS SULFATE 325(65) MG
325 TABLET ORAL
COMMUNITY

## 2020-06-29 NOTE — TELEPHONE ENCOUNTER
Patient received her first IV Stelara 6/9/2020  She will be due for her 8 week Stelara Injection 8/4/2020  I will confirm with [address] [facility] that her Stelara Injection has been approved  I reached out to Jocelyn Hsu, awaiting her response

## 2020-06-29 NOTE — TELEPHONE ENCOUNTER
Message from [facility]:  Her injection was also approved but not until 7/10 so we will not be able to process until then  The approval is good from 7/10/20 to 5/22/21 for Stelara 90mg sq syringe  As long as she remains active with the plan we will be good to go  The first fill for the maintenance dose is scheduled to be processed on 7/22

## 2020-08-07 DIAGNOSIS — R10.9 ABDOMINAL CRAMPING: ICD-10-CM

## 2020-08-07 RX ORDER — DICYCLOMINE HYDROCHLORIDE 10 MG/1
10 CAPSULE ORAL
Qty: 120 CAPSULE | Refills: 5 | Status: ON HOLD | OUTPATIENT
Start: 2020-08-07 | End: 2020-10-29 | Stop reason: CLARIF

## 2020-08-07 NOTE — TELEPHONE ENCOUNTER
Pt left  mssg stating she has no refills of Dicyclomine; assks for new script because it helps w/ her stomach pain  # [phone]

## 2020-09-23 ENCOUNTER — OFFICE VISIT (OUTPATIENT)
Dept: GASTROENTEROLOGY | Facility: CLINIC | Age: 30
End: 2020-09-23
Payer: COMMERCIAL

## 2020-09-23 VITALS
SYSTOLIC BLOOD PRESSURE: 120 MMHG | TEMPERATURE: 97.5 F | DIASTOLIC BLOOD PRESSURE: 80 MMHG | BODY MASS INDEX: 20.8 KG/M2 | HEIGHT: 62 IN | WEIGHT: 113 LBS

## 2020-09-23 DIAGNOSIS — D50.9 IRON DEFICIENCY ANEMIA, UNSPECIFIED IRON DEFICIENCY ANEMIA TYPE: ICD-10-CM

## 2020-09-23 DIAGNOSIS — K50.111 CROHN'S DISEASE OF COLON WITH RECTAL BLEEDING (HCC): Primary | ICD-10-CM

## 2020-09-23 DIAGNOSIS — Z12.11 COLON CANCER SCREENING: ICD-10-CM

## 2020-09-23 PROCEDURE — 99214 OFFICE O/P EST MOD 30 MIN: CPT | Performed by: NURSE PRACTITIONER

## 2020-09-23 NOTE — PATIENT INSTRUCTIONS
Continue Stelara every 8 weeks   Check labs , I will contact you with lab results     Follow up in 3-4 months

## 2020-09-23 NOTE — PROGRESS NOTES
[facility] - Outpatient Follow-up Note  Anthony Tolentino 26 y o  female MRN: [id number]  Encounter: [id number]    ASSESSMENT AND PLAN:      1  Crohn's disease of colon with rectal bleeding ([address] )  Longstanding history of Crohn's disease  She recently started Stelara in June 2020  Her loading infusion was on 06/09/2020 and her 1st maintenance dose was in August 2020  She reports that she still experiences occasional abdominal discomfort, but this has improved since starting Stelara  Denies any diarrhea or rectal bleeding  She also notes improvement since starting medical marijuana, she use this a few times per week  Colonoscopy performed on 04/10/2020 showed a Crohn stricture in the mid transverse colon, mild inflammation and shallow ulcerations, small pseudo polyps in the cecum and anal rectal superficial ulcers  Biopsies negative for dysplasia or malignancy  - continue Stelara subQ injections every 8 weeks   - encouraged regular Dermatology screenings  - Comprehensive metabolic panel; Future  - will check ESR and CRP     2  Iron deficiency anemia, unspecified iron deficiency anemia type  History of iron deficiency anemia  She follows with Dr Nyasia Morgan year from Hematology and has received periodic IV Venofer infusions in the past   She does feel more fatigued recently  Iron panel on 05/08/2020 was normal      - continue ferrous sulfate daily  Will check:   - CBC and differential; Future  - Fe+TIBC+[place]; Future  - continue to follow up with Hematology as scheduled       3  Colon cancer screening  Colonoscopy performed on 04/10/2020 showed a Crohn stricture in the mid transverse colon, mild inflammation and shallow ulcerations, small pseudo polyps in the cecum and anal rectal superficial ulcers  Biopsies negative for dysplasia or malignancy      - repeat colonoscopy recommended in 2 years       Followup Appointment: 3-4 months ______________________________________________________________________    Chief Complaint   Patient presents with    Follow-up     scope    Crohn's Disease     HPI:  Leona Lassiter is a 26y o  year old female who was seen for a follow-up visit for Crohn's disease  She was last seen for virtual visit on 2020  She states that since beginning Stelara in 2020 her symptoms have improved  She does still experience intermittent abdominal discomfort, but this has improved since starting Stelara  She also has been using medical marijuana recently, reports that she will use a few times per week and this improves her symptoms as well  She denies any diarrhea or rectal bleeding  She is moving her bowels on a regular basis and is having one soft formed bowel movement per day  She denies any fever, chills, upper GI symptoms, nausea, vomiting, change in bowel habits, hematochezia or melena  Her appetite and weight are stable  She does have a previous history of drug abuse, but has been clean for the past 7 years        Historical Information   Past Medical History:   Diagnosis Date    Anxiety     Crohn disease ([address] )      Past Surgical History:   Procedure Laterality Date    COLONOSCOPY  04/10/2020    Crohn's stricture in the mid transverse colon, mild inflammation and shallow ulcerations, small pseudo polyps in the cecum, anal rectal superficial ulcers, biopsies negative for dysplasia, metaplasia recall 2 years    WISDOM TOOTH EXTRACTION       Social History     Substance and Sexual Activity   Alcohol Use Yes    Frequency: Monthly or less     Social History     Substance and Sexual Activity   Drug Use No    Comment: hx of percocet and dilaudid abuse      Social History     Tobacco Use   Smoking Status Former Smoker    Packs/day: 0 10    Years: 1 00    Pack years: 0 10    Types: Cigarettes    Start date:     Last attempt to quit:     Years since quittin 7   Smokeless Tobacco Never Used     Family History   Problem Relation Age of Onset    Heart disease Mother     Diabetes Mother     Hypertension Mother     Heart disease Father     Diabetes Father     Hypertension Father     Colon cancer Neg Hx     Colon polyps Neg Hx          Current Outpatient Medications:     buprenorphine (SUBUTEX) 8 mg    dicyclomine (BENTYL) 10 mg capsule    doxycycline (PERIOSTAT) 20 MG tablet    ferrous sulfate 325 (65 Fe) mg tablet    ibuprofen (MOTRIN) 200 mg tablet    sertraline (ZOLOFT) 100 mg tablet    ustekinumab (Stelara) 90 mg/mL subcutaneous injection    docusate sodium (COLACE) 100 mg capsule    predniSONE 20 mg tablet  Allergies   Allergen Reactions    Gabapentin Seizures    Vancomycin Hives     Reviewed medications and allergies and updated as indicated    PHYSICAL EXAM:    Blood pressure 120/80, temperature 97 5 °F (36 4 °C), height 5' 2" (1 575 m), weight 51 3 kg (113 lb)  Body mass index is 20 67 kg/m²  General Appearance: NAD, cooperative, alert  Eyes: Anicteric, PERRLA, EOMI  ENT:  Normocephalic, atraumatic, normal mucosa  Neck:  Supple, symmetrical, trachea midline  Resp:  Clear to auscultation bilaterally; no rales, rhonchi or wheezing; respirations unlabored   CV:  S1 S2, Regular rate and rhythm; no murmur, rub, or gallop  GI:  Soft, +mild R mid and RLQ abd ttp, no guarding or rebound, normal bowel sounds; no masses, no organomegaly   Rectal: Deferred  Musculoskeletal: No cyanosis, clubbing or edema  Normal ROM    Skin:  No jaundice, rashes, or lesions   Heme/Lymph: No palpable cervical lymphadenopathy  Psych: Normal affect, good eye contact  Neuro: No gross deficits, AAOx3    Lab Results:   Lab Results   Component Value Date    WBC 5 19 03/26/2020    HGB 7 9 (L) 03/26/2020    HCT 30 0 (L) 03/26/2020    MCV 79 (L) 03/26/2020     03/26/2020     Lab Results   Component Value Date     07/28/2015    K 3 8 03/26/2020     03/26/2020    CO2 30 03/26/2020 ANIONGAP 6 07/28/2015    BUN 10 03/26/2020    CREATININE 1 08 03/26/2020    GLUCOSE 91 07/28/2015    CALCIUM 8 4 03/26/2020    AST 17 03/26/2020    ALT 14 03/26/2020    ALKPHOS 75 03/26/2020    PROT 6 1 (L) 07/28/2015    BILITOT 0 3 07/28/2015    EGFR 69 03/26/2020     No results found for: IRON, TIBC, FERRITIN  Lab Results   Component Value Date    LIPASE 63 (L) 07/28/2015       Radiology Results:   No results found

## 2020-09-25 LAB
ALBUMIN SERPL-MCNC: 3.7 G/DL (ref 3.9–5)
ALBUMIN/GLOB SERPL: 1.6 {RATIO} (ref 1.2–2.2)
ALP SERPL-CCNC: 72 IU/L (ref 39–117)
ALT SERPL-CCNC: 18 IU/L (ref 0–32)
AST SERPL-CCNC: 15 IU/L (ref 0–40)
BASOPHILS # BLD AUTO: 0 X10E3/UL (ref 0–0.2)
BASOPHILS NFR BLD AUTO: 1 %
BILIRUB SERPL-MCNC: 0.2 MG/DL (ref 0–1.2)
BUN SERPL-MCNC: 9 MG/DL (ref 6–20)
BUN/CREAT SERPL: 11 (ref 9–23)
CALCIUM SERPL-MCNC: 9 MG/DL (ref 8.7–10.2)
CHLORIDE SERPL-SCNC: 102 MMOL/L (ref 96–106)
CO2 SERPL-SCNC: 22 MMOL/L (ref 20–29)
CREAT SERPL-MCNC: 0.83 MG/DL (ref 0.57–1)
EOSINOPHIL # BLD AUTO: 0.3 X10E3/UL (ref 0–0.4)
EOSINOPHIL NFR BLD AUTO: 5 %
ERYTHROCYTE [DISTWIDTH] IN BLOOD BY AUTOMATED COUNT: 13.7 % (ref 11.7–15.4)
FERRITIN SERPL-MCNC: 40 NG/ML (ref 15–150)
GLOBULIN SER-MCNC: 2.3 G/DL (ref 1.5–4.5)
GLUCOSE SERPL-MCNC: 91 MG/DL (ref 65–99)
HCT VFR BLD AUTO: 35.8 % (ref 34–46.6)
HGB BLD-MCNC: 11.7 G/DL (ref 11.1–15.9)
IMM GRANULOCYTES # BLD: 0 X10E3/UL (ref 0–0.1)
IMM GRANULOCYTES NFR BLD: 0 %
IRON SATN MFR SERPL: 43 % (ref 15–55)
IRON SERPL-MCNC: 112 UG/DL (ref 27–159)
LYMPHOCYTES # BLD AUTO: 0.5 X10E3/UL (ref 0.7–3.1)
LYMPHOCYTES NFR BLD AUTO: 10 %
MCH RBC QN AUTO: 29.3 PG (ref 26.6–33)
MCHC RBC AUTO-ENTMCNC: 32.7 G/DL (ref 31.5–35.7)
MCV RBC AUTO: 90 FL (ref 79–97)
MONOCYTES # BLD AUTO: 0.4 X10E3/UL (ref 0.1–0.9)
MONOCYTES NFR BLD AUTO: 7 %
NEUTROPHILS # BLD AUTO: 4.4 X10E3/UL (ref 1.4–7)
NEUTROPHILS NFR BLD AUTO: 77 %
PLATELET # BLD AUTO: 276 X10E3/UL (ref 150–450)
POTASSIUM SERPL-SCNC: 4.2 MMOL/L (ref 3.5–5.2)
PROT SERPL-MCNC: 6 G/DL (ref 6–8.5)
RBC # BLD AUTO: 4 X10E6/UL (ref 3.77–5.28)
SL AMB EGFR AFRICAN AMERICAN: 109 ML/MIN/1.73
SL AMB EGFR NON AFRICAN AMERICAN: 95 ML/MIN/1.73
SODIUM SERPL-SCNC: 136 MMOL/L (ref 134–144)
TIBC SERPL-MCNC: 263 UG/DL (ref 250–450)
UIBC SERPL-MCNC: 151 UG/DL (ref 131–425)
WBC # BLD AUTO: 5.6 X10E3/UL (ref 3.4–10.8)

## 2020-09-29 ENCOUNTER — TELEPHONE (OUTPATIENT)
Dept: GASTROENTEROLOGY | Facility: CLINIC | Age: 30
End: 2020-09-29

## 2020-10-28 ENCOUNTER — HOSPITAL ENCOUNTER (INPATIENT)
Facility: HOSPITAL | Age: 30
LOS: 1 days | Discharge: HOME | DRG: 245 | End: 2020-10-30
Attending: EMERGENCY MEDICINE | Admitting: INTERNAL MEDICINE
Payer: COMMERCIAL

## 2020-10-28 DIAGNOSIS — K50.90 CROHN'S DISEASE (HCC): ICD-10-CM

## 2020-10-28 DIAGNOSIS — K56.609 SMALL BOWEL OBSTRUCTION (HCC): ICD-10-CM

## 2020-10-28 DIAGNOSIS — R10.9 ABDOMINAL PAIN: Primary | ICD-10-CM

## 2020-10-28 DIAGNOSIS — K50.111 CROHN'S DISEASE OF COLON WITH RECTAL BLEEDING (HCC): ICD-10-CM

## 2020-10-28 LAB
BASOPHILS # BLD AUTO: 0.02 THOUSANDS/ΜL (ref 0–0.1)
BASOPHILS NFR BLD AUTO: 0 % (ref 0–1)
EOSINOPHIL # BLD AUTO: 0.16 THOUSAND/ΜL (ref 0–0.61)
EOSINOPHIL NFR BLD AUTO: 3 % (ref 0–6)
ERYTHROCYTE [DISTWIDTH] IN BLOOD BY AUTOMATED COUNT: 13.1 % (ref 11.6–15.1)
ERYTHROCYTE [SEDIMENTATION RATE] IN BLOOD: 23 MM/HOUR (ref 0–19)
HCT VFR BLD AUTO: 40.2 % (ref 34.8–46.1)
HGB BLD-MCNC: 12.8 G/DL (ref 11.5–15.4)
IMM GRANULOCYTES # BLD AUTO: 0.01 THOUSAND/UL (ref 0–0.2)
IMM GRANULOCYTES NFR BLD AUTO: 0 % (ref 0–2)
LYMPHOCYTES # BLD AUTO: 0.7 THOUSANDS/ΜL (ref 0.6–4.47)
LYMPHOCYTES NFR BLD AUTO: 13 % (ref 14–44)
MCH RBC QN AUTO: 29.3 PG (ref 26.8–34.3)
MCHC RBC AUTO-ENTMCNC: 31.8 G/DL (ref 31.4–37.4)
MCV RBC AUTO: 92 FL (ref 82–98)
MONOCYTES # BLD AUTO: 0.45 THOUSAND/ΜL (ref 0.17–1.22)
MONOCYTES NFR BLD AUTO: 9 % (ref 4–12)
NEUTROPHILS # BLD AUTO: 3.89 THOUSANDS/ΜL (ref 1.85–7.62)
NEUTS SEG NFR BLD AUTO: 75 % (ref 43–75)
NRBC BLD AUTO-RTO: 0 /100 WBCS
PLATELET # BLD AUTO: 252 THOUSANDS/UL (ref 149–390)
PMV BLD AUTO: 10 FL (ref 8.9–12.7)
RBC # BLD AUTO: 4.37 MILLION/UL (ref 3.81–5.12)
WBC # BLD AUTO: 5.23 THOUSAND/UL (ref 4.31–10.16)

## 2020-10-28 PROCEDURE — 85652 RBC SED RATE AUTOMATED: CPT | Performed by: EMERGENCY MEDICINE

## 2020-10-28 PROCEDURE — 83690 ASSAY OF LIPASE: CPT | Performed by: EMERGENCY MEDICINE

## 2020-10-28 PROCEDURE — 86140 C-REACTIVE PROTEIN: CPT | Performed by: EMERGENCY MEDICINE

## 2020-10-28 PROCEDURE — 80053 COMPREHEN METABOLIC PANEL: CPT | Performed by: EMERGENCY MEDICINE

## 2020-10-28 PROCEDURE — 36415 COLL VENOUS BLD VENIPUNCTURE: CPT | Performed by: EMERGENCY MEDICINE

## 2020-10-28 PROCEDURE — 99285 EMERGENCY DEPT VISIT HI MDM: CPT | Performed by: EMERGENCY MEDICINE

## 2020-10-28 PROCEDURE — 99285 EMERGENCY DEPT VISIT HI MDM: CPT

## 2020-10-28 PROCEDURE — 85025 COMPLETE CBC W/AUTO DIFF WBC: CPT | Performed by: EMERGENCY MEDICINE

## 2020-10-28 PROCEDURE — 81025 URINE PREGNANCY TEST: CPT | Performed by: EMERGENCY MEDICINE

## 2020-10-29 ENCOUNTER — APPOINTMENT (EMERGENCY)
Dept: CT IMAGING | Facility: HOSPITAL | Age: 30
DRG: 245 | End: 2020-10-29
Payer: COMMERCIAL

## 2020-10-29 PROBLEM — F11.20 OPIOID DEPENDENCE (HCC): Status: ACTIVE | Noted: 2020-10-29

## 2020-10-29 PROBLEM — K56.609 SMALL BOWEL OBSTRUCTION (HCC): Status: ACTIVE | Noted: 2020-10-29

## 2020-10-29 LAB
ALBUMIN SERPL BCP-MCNC: 3.1 G/DL (ref 3.5–5)
ALP SERPL-CCNC: 76 U/L (ref 46–116)
ALT SERPL W P-5'-P-CCNC: 19 U/L (ref 12–78)
ANION GAP SERPL CALCULATED.3IONS-SCNC: 5 MMOL/L (ref 4–13)
AST SERPL W P-5'-P-CCNC: 15 U/L (ref 5–45)
BACTERIA UR QL AUTO: NORMAL /HPF
BILIRUB SERPL-MCNC: 0.2 MG/DL (ref 0.2–1)
BILIRUB UR QL STRIP: NEGATIVE
BUN SERPL-MCNC: 9 MG/DL (ref 5–25)
CALCIUM ALBUM COR SERPL-MCNC: 9.7 MG/DL (ref 8.3–10.1)
CALCIUM SERPL-MCNC: 9 MG/DL (ref 8.3–10.1)
CHLORIDE SERPL-SCNC: 102 MMOL/L (ref 100–108)
CLARITY UR: CLEAR
CO2 SERPL-SCNC: 32 MMOL/L (ref 21–32)
COLOR UR: YELLOW
CREAT SERPL-MCNC: 0.85 MG/DL (ref 0.6–1.3)
CRP SERPL QL: 26.9 MG/L
EXT FECAL OCCULT BLOOD SCREEN: NEGATIVE
EXT PREG TEST URINE: NEGATIVE
EXT. CONTROL ED NAV: NORMAL
EXT. CONTROL: NORMAL
GFR SERPL CREATININE-BSD FRML MDRD: 92 ML/MIN/1.73SQ M
GLUCOSE SERPL-MCNC: 87 MG/DL (ref 65–140)
GLUCOSE UR STRIP-MCNC: NEGATIVE MG/DL
HCT VFR BLD AUTO: 36.6 % (ref 34.8–46.1)
HCT VFR BLD AUTO: 37.5 % (ref 34.8–46.1)
HCT VFR BLD AUTO: 39 % (ref 34.8–46.1)
HGB BLD-MCNC: 11.7 G/DL (ref 11.5–15.4)
HGB BLD-MCNC: 11.7 G/DL (ref 11.5–15.4)
HGB BLD-MCNC: 12.4 G/DL (ref 11.5–15.4)
HGB UR QL STRIP.AUTO: ABNORMAL
KETONES UR STRIP-MCNC: NEGATIVE MG/DL
LEUKOCYTE ESTERASE UR QL STRIP: NEGATIVE
LIPASE SERPL-CCNC: 88 U/L (ref 73–393)
NITRITE UR QL STRIP: NEGATIVE
NON-SQ EPI CELLS URNS QL MICRO: NORMAL /HPF
PH UR STRIP.AUTO: 5.5 [PH]
POTASSIUM SERPL-SCNC: 3.8 MMOL/L (ref 3.5–5.3)
PROT SERPL-MCNC: 7.2 G/DL (ref 6.4–8.2)
PROT UR STRIP-MCNC: NEGATIVE MG/DL
RBC #/AREA URNS AUTO: NORMAL /HPF
SODIUM SERPL-SCNC: 139 MMOL/L (ref 136–145)
SP GR UR STRIP.AUTO: >=1.03 (ref 1–1.03)
UROBILINOGEN UR QL STRIP.AUTO: 0.2 E.U./DL
WBC #/AREA URNS AUTO: NORMAL /HPF

## 2020-10-29 PROCEDURE — 81001 URINALYSIS AUTO W/SCOPE: CPT | Performed by: EMERGENCY MEDICINE

## 2020-10-29 PROCEDURE — 74177 CT ABD & PELVIS W/CONTRAST: CPT

## 2020-10-29 PROCEDURE — G1004 CDSM NDSC: HCPCS

## 2020-10-29 PROCEDURE — 99223 1ST HOSP IP/OBS HIGH 75: CPT | Performed by: INTERNAL MEDICINE

## 2020-10-29 PROCEDURE — 99254 IP/OBS CNSLTJ NEW/EST MOD 60: CPT | Performed by: INTERNAL MEDICINE

## 2020-10-29 PROCEDURE — 85018 HEMOGLOBIN: CPT | Performed by: PHYSICIAN ASSISTANT

## 2020-10-29 PROCEDURE — 82270 OCCULT BLOOD FECES: CPT | Performed by: EMERGENCY MEDICINE

## 2020-10-29 PROCEDURE — 85014 HEMATOCRIT: CPT | Performed by: PHYSICIAN ASSISTANT

## 2020-10-29 RX ORDER — LORAZEPAM 1 MG/1
1 TABLET ORAL ONCE
Status: COMPLETED | OUTPATIENT
Start: 2020-10-29 | End: 2020-10-29

## 2020-10-29 RX ORDER — SERTRALINE HYDROCHLORIDE 100 MG/1
100 TABLET, FILM COATED ORAL DAILY
Status: DISCONTINUED | OUTPATIENT
Start: 2020-10-29 | End: 2020-10-30 | Stop reason: HOSPADM

## 2020-10-29 RX ORDER — FERROUS SULFATE 325(65) MG
325 TABLET ORAL
Status: DISCONTINUED | OUTPATIENT
Start: 2020-10-29 | End: 2020-10-30 | Stop reason: HOSPADM

## 2020-10-29 RX ORDER — ACETAMINOPHEN 325 MG/1
975 TABLET ORAL ONCE
Status: COMPLETED | OUTPATIENT
Start: 2020-10-29 | End: 2020-10-29

## 2020-10-29 RX ORDER — KETOROLAC TROMETHAMINE 30 MG/ML
15 INJECTION, SOLUTION INTRAMUSCULAR; INTRAVENOUS EVERY 6 HOURS PRN
Status: DISCONTINUED | OUTPATIENT
Start: 2020-10-29 | End: 2020-10-30 | Stop reason: HOSPADM

## 2020-10-29 RX ORDER — DICYCLOMINE HYDROCHLORIDE 10 MG/1
10 CAPSULE ORAL
COMMUNITY
End: 2021-08-20 | Stop reason: SDUPTHER

## 2020-10-29 RX ORDER — METHYLPREDNISOLONE SODIUM SUCCINATE 40 MG/ML
20 INJECTION, POWDER, LYOPHILIZED, FOR SOLUTION INTRAMUSCULAR; INTRAVENOUS EVERY 8 HOURS SCHEDULED
Status: DISCONTINUED | OUTPATIENT
Start: 2020-10-29 | End: 2020-10-30 | Stop reason: HOSPADM

## 2020-10-29 RX ORDER — DICYCLOMINE HCL 20 MG
20 TABLET ORAL ONCE
Status: COMPLETED | OUTPATIENT
Start: 2020-10-29 | End: 2020-10-29

## 2020-10-29 RX ORDER — ONDANSETRON 2 MG/ML
4 INJECTION INTRAMUSCULAR; INTRAVENOUS EVERY 6 HOURS PRN
Status: DISCONTINUED | OUTPATIENT
Start: 2020-10-29 | End: 2020-10-30 | Stop reason: HOSPADM

## 2020-10-29 RX ORDER — SODIUM CHLORIDE 9 MG/ML
75 INJECTION, SOLUTION INTRAVENOUS CONTINUOUS
Status: DISCONTINUED | OUTPATIENT
Start: 2020-10-29 | End: 2020-10-30

## 2020-10-29 RX ORDER — BUPRENORPHINE AND NALOXONE 8; 2 MG/1; MG/1
1 FILM, SOLUBLE BUCCAL; SUBLINGUAL 2 TIMES DAILY
COMMUNITY

## 2020-10-29 RX ORDER — BUPRENORPHINE AND NALOXONE 8; 2 MG/1; MG/1
1 FILM, SOLUBLE BUCCAL; SUBLINGUAL 2 TIMES DAILY
Status: DISCONTINUED | OUTPATIENT
Start: 2020-10-29 | End: 2020-10-30 | Stop reason: HOSPADM

## 2020-10-29 RX ORDER — ACETAMINOPHEN 325 MG/1
650 TABLET ORAL EVERY 6 HOURS PRN
Status: DISCONTINUED | OUTPATIENT
Start: 2020-10-29 | End: 2020-10-30 | Stop reason: HOSPADM

## 2020-10-29 RX ADMIN — DICYCLOMINE HYDROCHLORIDE 20 MG: 20 TABLET ORAL at 00:26

## 2020-10-29 RX ADMIN — ACETAMINOPHEN 975 MG: 325 TABLET ORAL at 00:26

## 2020-10-29 RX ADMIN — BUPRENORPHINE AND NALOXONE 1 FILM: 8; 2 FILM BUCCAL; SUBLINGUAL at 20:43

## 2020-10-29 RX ADMIN — BUPRENORPHINE AND NALOXONE 1 FILM: 8; 2 FILM BUCCAL; SUBLINGUAL at 09:02

## 2020-10-29 RX ADMIN — SODIUM CHLORIDE 100 ML/HR: 0.9 INJECTION, SOLUTION INTRAVENOUS at 09:20

## 2020-10-29 RX ADMIN — IOHEXOL 100 ML: 350 INJECTION, SOLUTION INTRAVENOUS at 01:28

## 2020-10-29 RX ADMIN — SODIUM CHLORIDE 100 ML/HR: 0.9 INJECTION, SOLUTION INTRAVENOUS at 19:57

## 2020-10-29 RX ADMIN — METHYLPREDNISOLONE SODIUM SUCCINATE 20 MG: 40 INJECTION, POWDER, FOR SOLUTION INTRAMUSCULAR; INTRAVENOUS at 21:00

## 2020-10-29 RX ADMIN — FERROUS SULFATE TAB 325 MG (65 MG ELEMENTAL FE) 325 MG: 325 (65 FE) TAB at 09:12

## 2020-10-29 RX ADMIN — SERTRALINE HYDROCHLORIDE 100 MG: 100 TABLET ORAL at 09:02

## 2020-10-29 RX ADMIN — LORAZEPAM 1 MG: 1 TABLET ORAL at 02:38

## 2020-10-29 RX ADMIN — METHYLPREDNISOLONE SODIUM SUCCINATE 20 MG: 40 INJECTION, POWDER, FOR SOLUTION INTRAMUSCULAR; INTRAVENOUS at 14:09

## 2020-10-29 RX ADMIN — ACETAMINOPHEN 650 MG: 325 TABLET ORAL at 09:20

## 2020-10-30 VITALS
HEART RATE: 70 BPM | TEMPERATURE: 97.7 F | OXYGEN SATURATION: 98 % | RESPIRATION RATE: 16 BRPM | BODY MASS INDEX: 20.81 KG/M2 | DIASTOLIC BLOOD PRESSURE: 82 MMHG | WEIGHT: 113.1 LBS | HEIGHT: 62 IN | SYSTOLIC BLOOD PRESSURE: 117 MMHG

## 2020-10-30 LAB
ANION GAP SERPL CALCULATED.3IONS-SCNC: 8 MMOL/L (ref 4–13)
BASOPHILS # BLD AUTO: 0 THOUSANDS/ΜL (ref 0–0.1)
BASOPHILS NFR BLD AUTO: 0 % (ref 0–1)
BUN SERPL-MCNC: 8 MG/DL (ref 5–25)
CALCIUM SERPL-MCNC: 7.8 MG/DL (ref 8.3–10.1)
CHLORIDE SERPL-SCNC: 104 MMOL/L (ref 100–108)
CO2 SERPL-SCNC: 24 MMOL/L (ref 21–32)
CREAT SERPL-MCNC: 0.76 MG/DL (ref 0.6–1.3)
EOSINOPHIL # BLD AUTO: 0 THOUSAND/ΜL (ref 0–0.61)
EOSINOPHIL NFR BLD AUTO: 0 % (ref 0–6)
ERYTHROCYTE [DISTWIDTH] IN BLOOD BY AUTOMATED COUNT: 12.7 % (ref 11.6–15.1)
GFR SERPL CREATININE-BSD FRML MDRD: 106 ML/MIN/1.73SQ M
GLUCOSE SERPL-MCNC: 101 MG/DL (ref 65–140)
HCT VFR BLD AUTO: 40.1 % (ref 34.8–46.1)
HGB BLD-MCNC: 12.7 G/DL (ref 11.5–15.4)
IMM GRANULOCYTES # BLD AUTO: 0.02 THOUSAND/UL (ref 0–0.2)
IMM GRANULOCYTES NFR BLD AUTO: 1 % (ref 0–2)
LYMPHOCYTES # BLD AUTO: 0.35 THOUSANDS/ΜL (ref 0.6–4.47)
LYMPHOCYTES NFR BLD AUTO: 8 % (ref 14–44)
MAGNESIUM SERPL-MCNC: 1.6 MG/DL (ref 1.6–2.6)
MCH RBC QN AUTO: 29.1 PG (ref 26.8–34.3)
MCHC RBC AUTO-ENTMCNC: 31.7 G/DL (ref 31.4–37.4)
MCV RBC AUTO: 92 FL (ref 82–98)
MONOCYTES # BLD AUTO: 0.09 THOUSAND/ΜL (ref 0.17–1.22)
MONOCYTES NFR BLD AUTO: 2 % (ref 4–12)
NEUTROPHILS # BLD AUTO: 3.71 THOUSANDS/ΜL (ref 1.85–7.62)
NEUTS SEG NFR BLD AUTO: 89 % (ref 43–75)
NRBC BLD AUTO-RTO: 0 /100 WBCS
PHOSPHATE SERPL-MCNC: 3.8 MG/DL (ref 2.7–4.5)
PLATELET # BLD AUTO: 260 THOUSANDS/UL (ref 149–390)
PMV BLD AUTO: 10.2 FL (ref 8.9–12.7)
POTASSIUM SERPL-SCNC: 4.1 MMOL/L (ref 3.5–5.3)
RBC # BLD AUTO: 4.37 MILLION/UL (ref 3.81–5.12)
SODIUM SERPL-SCNC: 136 MMOL/L (ref 136–145)
WBC # BLD AUTO: 4.17 THOUSAND/UL (ref 4.31–10.16)

## 2020-10-30 PROCEDURE — 85025 COMPLETE CBC W/AUTO DIFF WBC: CPT | Performed by: INTERNAL MEDICINE

## 2020-10-30 PROCEDURE — 83735 ASSAY OF MAGNESIUM: CPT | Performed by: INTERNAL MEDICINE

## 2020-10-30 PROCEDURE — 99232 SBSQ HOSP IP/OBS MODERATE 35: CPT | Performed by: NURSE PRACTITIONER

## 2020-10-30 PROCEDURE — 99239 HOSP IP/OBS DSCHRG MGMT >30: CPT | Performed by: INTERNAL MEDICINE

## 2020-10-30 PROCEDURE — 80048 BASIC METABOLIC PNL TOTAL CA: CPT | Performed by: INTERNAL MEDICINE

## 2020-10-30 PROCEDURE — 84100 ASSAY OF PHOSPHORUS: CPT | Performed by: INTERNAL MEDICINE

## 2020-10-30 RX ORDER — PREDNISONE 10 MG/1
TABLET ORAL
Qty: 70 TABLET | Refills: 0 | Status: SHIPPED | OUTPATIENT
Start: 2020-10-30 | End: 2021-01-15

## 2020-10-30 RX ADMIN — METHYLPREDNISOLONE SODIUM SUCCINATE 20 MG: 40 INJECTION, POWDER, FOR SOLUTION INTRAMUSCULAR; INTRAVENOUS at 05:37

## 2020-10-30 RX ADMIN — SERTRALINE HYDROCHLORIDE 100 MG: 100 TABLET ORAL at 09:08

## 2020-10-30 RX ADMIN — METHYLPREDNISOLONE SODIUM SUCCINATE 20 MG: 40 INJECTION, POWDER, FOR SOLUTION INTRAMUSCULAR; INTRAVENOUS at 14:45

## 2020-10-30 RX ADMIN — SODIUM CHLORIDE 100 ML/HR: 0.9 INJECTION, SOLUTION INTRAVENOUS at 05:44

## 2020-10-30 RX ADMIN — BUPRENORPHINE AND NALOXONE 1 FILM: 8; 2 FILM BUCCAL; SUBLINGUAL at 09:08

## 2020-10-30 RX ADMIN — FERROUS SULFATE TAB 325 MG (65 MG ELEMENTAL FE) 325 MG: 325 (65 FE) TAB at 09:08

## 2020-12-04 ENCOUNTER — OFFICE VISIT (OUTPATIENT)
Dept: GASTROENTEROLOGY | Facility: CLINIC | Age: 30
End: 2020-12-04
Payer: COMMERCIAL

## 2020-12-04 VITALS
SYSTOLIC BLOOD PRESSURE: 110 MMHG | DIASTOLIC BLOOD PRESSURE: 72 MMHG | WEIGHT: 114 LBS | HEART RATE: 75 BPM | BODY MASS INDEX: 20.98 KG/M2 | HEIGHT: 62 IN

## 2020-12-04 DIAGNOSIS — D50.9 IRON DEFICIENCY ANEMIA, UNSPECIFIED IRON DEFICIENCY ANEMIA TYPE: ICD-10-CM

## 2020-12-04 DIAGNOSIS — K50.111 CROHN'S DISEASE OF COLON WITH RECTAL BLEEDING (HCC): Primary | ICD-10-CM

## 2020-12-04 PROCEDURE — 99213 OFFICE O/P EST LOW 20 MIN: CPT | Performed by: NURSE PRACTITIONER

## 2021-01-04 ENCOUNTER — TELEPHONE (OUTPATIENT)
Dept: GASTROENTEROLOGY | Facility: CLINIC | Age: 31
End: 2021-01-04

## 2021-01-04 NOTE — TELEPHONE ENCOUNTER
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

## 2021-01-04 NOTE — TELEPHONE ENCOUNTER
DHARMESH--  Called pt back and recommended YES she should proceed with the vaccine if available  It would not be recommended if she is allergic to any component in the vaccine or if she is in her third trimester of pregnancy  Did recommend she look at the crohn's and colitis foundation website for more information if needed

## 2021-01-15 ENCOUNTER — OFFICE VISIT (OUTPATIENT)
Dept: GASTROENTEROLOGY | Facility: CLINIC | Age: 31
End: 2021-01-15
Payer: COMMERCIAL

## 2021-01-15 VITALS
HEART RATE: 80 BPM | WEIGHT: 120.6 LBS | DIASTOLIC BLOOD PRESSURE: 74 MMHG | BODY MASS INDEX: 22.19 KG/M2 | SYSTOLIC BLOOD PRESSURE: 122 MMHG | HEIGHT: 62 IN

## 2021-01-15 DIAGNOSIS — K50.911 CROHN'S DISEASE WITH RECTAL BLEEDING, UNSPECIFIED GASTROINTESTINAL TRACT LOCATION (HCC): Primary | ICD-10-CM

## 2021-01-15 DIAGNOSIS — D50.9 IRON DEFICIENCY ANEMIA, UNSPECIFIED IRON DEFICIENCY ANEMIA TYPE: ICD-10-CM

## 2021-01-15 PROCEDURE — 99213 OFFICE O/P EST LOW 20 MIN: CPT | Performed by: NURSE PRACTITIONER

## 2021-01-15 NOTE — PROGRESS NOTES
[address] - Outpatient Follow-up Note  Mara Gonzalez 31 y o  female MRN: [id number]  Encounter: [id number]    ASSESSMENT AND PLAN:      1  Crohn's disease with rectal bleeding, unspecified gastrointestinal tract location [facility])  Overall her symptoms have been doing well  She has been having a soft, formed bowel movement on a daily basis  Denies any further episodes of rectal bleeding or abdominal pain and cramping  She continues on Stelara every 8 weeks  Stelara drug levels and antibodies were ordered at last office visit, but patient only remembered about these labs this morning and just had them done 2 hours ago  Colonoscopy in April 2020 showed a Crohn's stricture in the mid transverse colon, mild inflammation and shallow ulcerations, small pseudo polyps and anal rectal superficial ulcers  Biopsies negative for dysplasia or metaplasia  - continue Stelara subQ injections every 8 weeks  - will check Stelara drug and antibody levels prior to next injection at the end of February   - encouraged regular dermatology evaluations    2  Iron deficiency anemia, unspecified iron deficiency anemia type  History of iron deficiency anemia  No overt GI blood loss at this time  Most recent hemoglobin in October 2020 was 12 7  Iron panel in September 2020 was normal     - continue iron supplementation daily  - will check repeat CBC and iron panel at follow-up visit      Followup Appointment: 3-4 months   ______________________________________________________________________    Chief Complaint   Patient presents with    Follow up Crohns     HPI:  Mara Gonzalez is a 31y o  year old female who was seen in the office today for a follow-up visit for Crohn's disease  She reports that overall she has been feeling well  She does report more fatigued recently    She denies any abdominal pain, rectal bleeding and reports that she is having a normal, soft and formed bowel movement on a daily basis  She also reports intermittent bloating that can be triggered by certain foods such as salads  She reports that work has been 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 [place]  She did receive the first COVID vaccine a few weeks ago and is scheduled for her second dose on 2021  She denies any fever, chills, heartburn, reflux, dysphagia, odynophagia, early satiety, nausea, vomiting, hematochezia, melena  Her appetite and weight are stable        Historical Information   Past Medical History:   Diagnosis Date    Anxiety     Crohn disease ([facility] [address] )      Past Surgical History:   Procedure Laterality Date    COLONOSCOPY  04/10/2020    Crohn's stricture in the mid transverse colon, mild inflammation and shallow ulcerations, small pseudo polyps in the cecum, anal rectal superficial ulcers, biopsies negative for dysplasia, metaplasia recall 2 years    WISDOM TOOTH EXTRACTION       Social History     Substance and Sexual Activity   Alcohol Use Yes    Frequency: Monthly or less    Drinks per session: 1 or 2    Binge frequency: Never     Social History     Substance and Sexual Activity   Drug Use Yes    Types: Marijuana    Comment: hx of percocet and dilaudid abuse, marijuana states is mediocak     Social History     Tobacco Use   Smoking Status Former Smoker    Packs/day: 0 10    Years: 1 00    Pack years: 0 10    Types: Cigarettes    Start date:     Quit date:     Years since quittin 0   Smokeless Tobacco Never Used     Family History   Problem Relation Age of Onset    Heart disease Mother     Diabetes Mother     Hypertension Mother     Heart disease Father     Diabetes Father     Hypertension Father     Colon cancer Neg Hx     Colon polyps Neg Hx          Current Outpatient Medications:     buprenorphine-naloxone (SUBOXONE) 8-2 mg    dicyclomine (BENTYL) 10 mg capsule    ferrous sulfate 325 (65 Fe) mg tablet    sertraline (ZOLOFT) 100 mg tablet    ustekinumab (Stelara) 90 mg/mL subcutaneous injection  Allergies   Allergen Reactions    Gabapentin Seizures    Vancomycin Hives     Reviewed medications and allergies and updated as indicated    PHYSICAL EXAM:    Blood pressure 122/74, pulse 80, height 5' 2" (1 575 m), weight 54 7 kg (120 lb 9 6 oz)  Body mass index is 22 06 kg/m²  General Appearance: NAD, cooperative, alert  Eyes: Anicteric, PERRLA, EOMI  ENT:  Normocephalic, atraumatic, normal mucosa  Neck:  Supple, symmetrical, trachea midline  Resp:  Clear to auscultation bilaterally; no rales, rhonchi or wheezing; respirations unlabored   CV:  S1 S2, Regular rate and rhythm; no murmur, rub, or gallop  GI:  Soft, non-tender, non-distended; normal bowel sounds; no masses, no organomegaly   Rectal: Deferred  Musculoskeletal: No cyanosis, clubbing or edema  Normal ROM  Skin:  No jaundice, rashes, or lesions   Heme/Lymph: No palpable cervical lymphadenopathy  Psych: Normal affect, good eye contact  Neuro: No gross deficits, AAOx3    Lab Results:   Lab Results   Component Value Date    WBC 4 17 (L) 10/30/2020    HGB 12 7 10/30/2020    HCT 40 1 10/30/2020    MCV 92 10/30/2020     10/30/2020     Lab Results   Component Value Date     07/28/2015    K 4 1 10/30/2020     10/30/2020    CO2 24 10/30/2020    ANIONGAP 6 07/28/2015    BUN 8 10/30/2020    CREATININE 0 76 10/30/2020    GLUCOSE 91 07/28/2015    CALCIUM 7 8 (L) 10/30/2020    CORRECTEDCA 9 7 10/28/2020    AST 15 10/28/2020    ALT 19 10/28/2020    ALKPHOS 76 10/28/2020    PROT 6 1 (L) 07/28/2015    BILITOT 0 3 07/28/2015    EGFR 106 10/30/2020     Lab Results   Component Value Date    IRON 112 09/24/2020    TIBC 263 09/24/2020    FERRITIN 40 09/24/2020     Lab Results   Component Value Date    LIPASE 88 10/28/2020       Radiology Results:   No results found

## 2021-01-15 NOTE — PATIENT INSTRUCTIONS
Continue Stelara every 8 weeks   Continue Bentyl as needed   Can look at FODMAP foods to identify triggers for bloating   Continue Gas-X as needed     Follow up in 3-4 months

## 2021-02-04 LAB
USTEKINUMAB AB SERPL IA-MCNC: 72 NG/ML
USTEKINUMAB SERPL IA-MCNC: 0.8 UG/ML

## 2021-04-06 DIAGNOSIS — K50.911 CROHN'S DISEASE WITH RECTAL BLEEDING, UNSPECIFIED GASTROINTESTINAL TRACT LOCATION (HCC): Primary | ICD-10-CM

## 2021-05-11 ENCOUNTER — TELEPHONE (OUTPATIENT)
Dept: GASTROENTEROLOGY | Facility: CLINIC | Age: 31
End: 2021-05-11

## 2021-05-11 DIAGNOSIS — Z11.1 SCREENING-PULMONARY TB: Primary | ICD-10-CM

## 2021-05-11 NOTE — TELEPHONE ENCOUNTER
Spoke to patient  States she did get TB QFR done she thinks  [facility] in last several months  Need to check LC for results

## 2021-05-11 NOTE — TELEPHONE ENCOUNTER
No results found in [facility]  PCP ordered testing 4/2021  New order to be printed for [facility] and mailed to home address

## 2021-07-16 ENCOUNTER — HOSPITAL ENCOUNTER (OUTPATIENT)
Dept: RADIOLOGY | Facility: HOSPITAL | Age: 31
Discharge: HOME | End: 2021-07-16
Payer: COMMERCIAL

## 2021-07-16 DIAGNOSIS — M25.50 PAIN IN JOINT, MULTIPLE SITES: ICD-10-CM

## 2021-07-16 PROCEDURE — 73080 X-RAY EXAM OF ELBOW: CPT

## 2021-08-20 DIAGNOSIS — R10.9 ABDOMINAL CRAMPING: ICD-10-CM

## 2021-08-20 DIAGNOSIS — K50.911 CROHN'S DISEASE WITH RECTAL BLEEDING, UNSPECIFIED GASTROINTESTINAL TRACT LOCATION (HCC): Primary | ICD-10-CM

## 2021-08-20 RX ORDER — DICYCLOMINE HYDROCHLORIDE 10 MG/1
10 CAPSULE ORAL
Qty: 120 CAPSULE | Refills: 3 | Status: SHIPPED | OUTPATIENT
Start: 2021-08-20 | End: 2021-09-19

## 2021-08-20 NOTE — TELEPHONE ENCOUNTER
Medication entered for your review and signature please  Pt was last seen 1/15/21 but Dicyclomine was noted to be continued prn on 12/4/20

## 2021-08-20 NOTE — TELEPHONE ENCOUNTER
Pt states she was going to refill med but it is ; requests refill for Dicyclomine to [facility] at [address]  If ques CB # [phone]  She also noted name change from [place] to [place] (already changed in [employer])

## 2021-10-04 ENCOUNTER — TELEPHONE (OUTPATIENT)
Dept: GASTROENTEROLOGY | Facility: CLINIC | Age: 31
End: 2021-10-04

## 2021-10-04 DIAGNOSIS — Z11.1 SCREENING EXAMINATION FOR PULMONARY TUBERCULOSIS: Primary | ICD-10-CM

## 2022-02-02 ENCOUNTER — TELEPHONE (OUTPATIENT)
Dept: GASTROENTEROLOGY | Facility: CLINIC | Age: 32
End: 2022-02-02

## 2022-07-08 ENCOUNTER — HOSPITAL ENCOUNTER (EMERGENCY)
Facility: HOSPITAL | Age: 32
Discharge: HOME | End: 2022-07-09
Attending: EMERGENCY MEDICINE | Admitting: EMERGENCY MEDICINE
Payer: COMMERCIAL

## 2022-07-08 DIAGNOSIS — R07.89 ATYPICAL CHEST PAIN: Primary | ICD-10-CM

## 2022-07-08 DIAGNOSIS — Z34.90 PREGNANCY: ICD-10-CM

## 2022-07-08 LAB
ALBUMIN SERPL BCP-MCNC: 3 G/DL (ref 3.5–5)
ALP SERPL-CCNC: 63 U/L (ref 46–116)
ALT SERPL W P-5'-P-CCNC: 17 U/L (ref 12–78)
ANION GAP SERPL CALCULATED.3IONS-SCNC: 7 MMOL/L (ref 4–13)
AST SERPL W P-5'-P-CCNC: 13 U/L (ref 5–45)
BASOPHILS # BLD AUTO: 0.01 THOUSANDS/ΜL (ref 0–0.1)
BASOPHILS NFR BLD AUTO: 0 % (ref 0–1)
BILIRUB SERPL-MCNC: 0.4 MG/DL (ref 0.2–1)
BILIRUB UR QL STRIP: ABNORMAL
BUN SERPL-MCNC: 7 MG/DL (ref 5–25)
CALCIUM ALBUM COR SERPL-MCNC: 9.5 MG/DL (ref 8.3–10.1)
CALCIUM SERPL-MCNC: 8.7 MG/DL (ref 8.3–10.1)
CHLORIDE SERPL-SCNC: 100 MMOL/L (ref 100–108)
CLARITY UR: CLEAR
CO2 SERPL-SCNC: 25 MMOL/L (ref 21–32)
COLOR UR: YELLOW
CREAT SERPL-MCNC: 0.88 MG/DL (ref 0.6–1.3)
EOSINOPHIL # BLD AUTO: 0.05 THOUSAND/ΜL (ref 0–0.61)
EOSINOPHIL NFR BLD AUTO: 1 % (ref 0–6)
ERYTHROCYTE [DISTWIDTH] IN BLOOD BY AUTOMATED COUNT: 16.8 % (ref 11.6–15.1)
EXT PREG TEST URINE: POSITIVE
EXT. CONTROL ED NAV: ABNORMAL
GFR SERPL CREATININE-BSD FRML MDRD: 87 ML/MIN/1.73SQ M
GLUCOSE SERPL-MCNC: 144 MG/DL (ref 65–140)
GLUCOSE UR STRIP-MCNC: NEGATIVE MG/DL
HCT VFR BLD AUTO: 33.5 % (ref 34.8–46.1)
HGB BLD-MCNC: 10.6 G/DL (ref 11.5–15.4)
HGB UR QL STRIP.AUTO: NEGATIVE
IMM GRANULOCYTES # BLD AUTO: 0 THOUSAND/UL (ref 0–0.2)
IMM GRANULOCYTES NFR BLD AUTO: 0 % (ref 0–2)
KETONES UR STRIP-MCNC: ABNORMAL MG/DL
LEUKOCYTE ESTERASE UR QL STRIP: NEGATIVE
LIPASE SERPL-CCNC: 45 U/L (ref 73–393)
LYMPHOCYTES # BLD AUTO: 0.69 THOUSANDS/ΜL (ref 0.6–4.47)
LYMPHOCYTES NFR BLD AUTO: 15 % (ref 14–44)
MCH RBC QN AUTO: 26.1 PG (ref 26.8–34.3)
MCHC RBC AUTO-ENTMCNC: 31.6 G/DL (ref 31.4–37.4)
MCV RBC AUTO: 83 FL (ref 82–98)
MONOCYTES # BLD AUTO: 0.28 THOUSAND/ΜL (ref 0.17–1.22)
MONOCYTES NFR BLD AUTO: 6 % (ref 4–12)
NEUTROPHILS # BLD AUTO: 3.48 THOUSANDS/ΜL (ref 1.85–7.62)
NEUTS SEG NFR BLD AUTO: 78 % (ref 43–75)
NITRITE UR QL STRIP: NEGATIVE
NRBC BLD AUTO-RTO: 0 /100 WBCS
PH UR STRIP.AUTO: 6 [PH]
PLATELET # BLD AUTO: 284 THOUSANDS/UL (ref 149–390)
PMV BLD AUTO: 9.9 FL (ref 8.9–12.7)
POTASSIUM SERPL-SCNC: 3.7 MMOL/L (ref 3.5–5.3)
PROT SERPL-MCNC: 6.8 G/DL (ref 6.4–8.2)
PROT UR STRIP-MCNC: NEGATIVE MG/DL
RBC # BLD AUTO: 4.06 MILLION/UL (ref 3.81–5.12)
SODIUM SERPL-SCNC: 132 MMOL/L (ref 136–145)
SP GR UR STRIP.AUTO: >=1.03 (ref 1–1.03)
UROBILINOGEN UR QL STRIP.AUTO: 0.2 E.U./DL
WBC # BLD AUTO: 4.51 THOUSAND/UL (ref 4.31–10.16)

## 2022-07-08 PROCEDURE — 81025 URINE PREGNANCY TEST: CPT | Performed by: EMERGENCY MEDICINE

## 2022-07-08 PROCEDURE — 80053 COMPREHEN METABOLIC PANEL: CPT | Performed by: EMERGENCY MEDICINE

## 2022-07-08 PROCEDURE — 83690 ASSAY OF LIPASE: CPT | Performed by: EMERGENCY MEDICINE

## 2022-07-08 PROCEDURE — 93005 ELECTROCARDIOGRAM TRACING: CPT

## 2022-07-08 PROCEDURE — 85025 COMPLETE CBC W/AUTO DIFF WBC: CPT | Performed by: EMERGENCY MEDICINE

## 2022-07-08 PROCEDURE — 81003 URINALYSIS AUTO W/O SCOPE: CPT | Performed by: EMERGENCY MEDICINE

## 2022-07-08 PROCEDURE — 36415 COLL VENOUS BLD VENIPUNCTURE: CPT

## 2022-07-08 PROCEDURE — 99284 EMERGENCY DEPT VISIT MOD MDM: CPT | Performed by: EMERGENCY MEDICINE

## 2022-07-08 PROCEDURE — 99285 EMERGENCY DEPT VISIT HI MDM: CPT

## 2022-07-08 RX ORDER — BUPRENORPHINE 2 MG/1
8 TABLET SUBLINGUAL DAILY
COMMUNITY

## 2022-07-08 RX ORDER — ONDANSETRON 2 MG/ML
4 INJECTION INTRAMUSCULAR; INTRAVENOUS ONCE
Status: COMPLETED | OUTPATIENT
Start: 2022-07-08 | End: 2022-07-09

## 2022-07-09 VITALS
TEMPERATURE: 98.8 F | RESPIRATION RATE: 16 BRPM | OXYGEN SATURATION: 100 % | DIASTOLIC BLOOD PRESSURE: 73 MMHG | BODY MASS INDEX: 22.08 KG/M2 | HEART RATE: 75 BPM | SYSTOLIC BLOOD PRESSURE: 96 MMHG | HEIGHT: 62 IN | WEIGHT: 120 LBS

## 2022-07-09 LAB
ATRIAL RATE: 68 BPM
CARDIAC TROPONIN I PNL SERPL HS: 2 NG/L
FLUAV RNA RESP QL NAA+PROBE: NEGATIVE
FLUBV RNA RESP QL NAA+PROBE: NEGATIVE
P AXIS: 58 DEGREES
PR INTERVAL: 112 MS
QRS AXIS: 75 DEGREES
QRSD INTERVAL: 78 MS
QT INTERVAL: 396 MS
QTC INTERVAL: 421 MS
RSV RNA RESP QL NAA+PROBE: NEGATIVE
SARS-COV-2 RNA RESP QL NAA+PROBE: NEGATIVE
T WAVE AXIS: 30 DEGREES
VENTRICULAR RATE: 68 BPM

## 2022-07-09 PROCEDURE — 96361 HYDRATE IV INFUSION ADD-ON: CPT

## 2022-07-09 PROCEDURE — 96374 THER/PROPH/DIAG INJ IV PUSH: CPT

## 2022-07-09 PROCEDURE — 93010 ELECTROCARDIOGRAM REPORT: CPT | Performed by: INTERNAL MEDICINE

## 2022-07-09 PROCEDURE — 0241U HB NFCT DS VIR RESP RNA 4 TRGT: CPT | Performed by: EMERGENCY MEDICINE

## 2022-07-09 PROCEDURE — 36415 COLL VENOUS BLD VENIPUNCTURE: CPT | Performed by: EMERGENCY MEDICINE

## 2022-07-09 PROCEDURE — 84484 ASSAY OF TROPONIN QUANT: CPT | Performed by: EMERGENCY MEDICINE

## 2022-07-09 RX ADMIN — ONDANSETRON 4 MG: 2 INJECTION INTRAMUSCULAR; INTRAVENOUS at 00:08

## 2022-07-09 RX ADMIN — SODIUM CHLORIDE 1000 ML: 0.9 INJECTION, SOLUTION INTRAVENOUS at 00:08

## 2022-07-09 NOTE — ED PROVIDER NOTES
History  Chief Complaint   Patient presents with    Chest Pain     Pt thinks she is having a chrohns flair, pt is having chest pain that is mid sternum, pt denies back pain  Pt is nausea, vomitted x1  Pt is fatigue  This is a 78-year-old female who is currently pregnant followed by an OBGYN at [facility]  Also has a history of Crohn's disease followed by [facility]  Presents with a five-day history substernal chest achiness nausea vomiting x1 epigastric pain and generalized fatigue feeling winded  On examination her lungs are clear and her room air saturation is in the upper 90s  History provided by:  Patient  Medical Problem  Location:   substernal  Quality:   achiness  Severity:  Moderate  Onset quality:  Gradual  Duration:  5 days  Timing:  Constant  Progression:  Waxing and waning  Chronicity:  New  Context:   substernal chest pain nausea vomiting x1 for the past 5 days  Associated symptoms: abdominal pain, chest pain, nausea, shortness of breath and vomiting        Prior to Admission Medications   Prescriptions Last Dose Informant Patient Reported? Taking?    Spironolactone (ALDACTONE PO) Past Week at Unknown time  Yes Yes   Sig: Take by mouth   buprenorphine (SUBUTEX) 2 mg Past Week at Unknown time  Yes Yes   Sig: Place 8 mg under the tongue daily   buprenorphine-naloxone (SUBOXONE) 8-2 mg Not Taking at Unknown time  Yes No   Sig: Place 1 Film under the tongue 2 (two) times a day    Patient not taking: Reported on 7/8/2022   dicyclomine (BENTYL) 10 mg capsule   No No   Sig: Take 1 capsule (10 mg total) by mouth 4 (four) times a day (before meals and at bedtime) As needed   ferrous sulfate 325 (65 Fe) mg tablet Not Taking at Unknown time  Yes No   Sig: Take 325 mg by mouth daily with breakfast   Patient not taking: Reported on 7/8/2022   sertraline (ZOLOFT) 100 mg tablet 7/8/2022 at Unknown time  Yes Yes   Sig: Take 150 mg by mouth daily   ustekinumab (Stelara) 90 mg/mL subcutaneous injection   No No   Sig: Inject 1 mL (90 mg total) under the skin every 56 days      Facility-Administered Medications: None       Past Medical History:   Diagnosis Date    Anxiety     Crohn disease ([facility] [address] )        Past Surgical History:   Procedure Laterality Date    COLONOSCOPY  04/10/2020    Crohn's stricture in the mid transverse colon, mild inflammation and shallow ulcerations, small pseudo polyps in the cecum, anal rectal superficial ulcers, biopsies negative for dysplasia, metaplasia recall 2 years    WISDOM TOOTH EXTRACTION         Family History   Problem Relation Age of Onset    Heart disease Mother     Diabetes Mother     Hypertension Mother     Heart disease Father     Diabetes Father     Hypertension Father     Colon cancer Neg Hx     Colon polyps Neg Hx      I have reviewed and agree with the history as documented  E-Cigarette/Vaping    E-Cigarette Use Current Every Day User     Cartridges/Day 1      E-Cigarette/Vaping Substances    Nicotine Yes     THC No     CBD No     Flavoring Yes     Other No     Unknown No      Social History     Tobacco Use    Smoking status: Former Smoker     Packs/day: 0 10     Years: 1 00     Pack years: 0 10     Types: Cigarettes     Start date:      Quit date:      Years since quittin 5    Smokeless tobacco: Never Used   Vaping Use    Vaping Use: Every day    Substances: Nicotine, Flavoring   Substance Use Topics    Alcohol use: Not Currently    Drug use: Yes     Types: Marijuana     Comment: hx of percocet and dilaudid abuse, marijuana states is mediocak       Review of Systems   Respiratory: Positive for shortness of breath  Cardiovascular: Positive for chest pain  Gastrointestinal: Positive for abdominal pain, nausea and vomiting  All other systems reviewed and are negative  Physical Exam  Physical Exam  Vitals and nursing note reviewed  Constitutional:       General: She is not in acute distress  Appearance: She is not ill-appearing, toxic-appearing or diaphoretic  HENT:      Head: Normocephalic and atraumatic  Right Ear: Tympanic membrane, ear canal and external ear normal       Left Ear: Tympanic membrane, ear canal and external ear normal       Nose: Nose normal    Eyes:      General: No scleral icterus  Right eye: No discharge  Left eye: No discharge  Extraocular Movements: Extraocular movements intact  Pupils: Pupils are equal, round, and reactive to light  Cardiovascular:      Rate and Rhythm: Normal rate and regular rhythm  Pulses: Normal pulses  Heart sounds: No murmur heard  No friction rub  No gallop  Pulmonary:      Effort: Pulmonary effort is normal  No respiratory distress  Breath sounds: No stridor  No wheezing, rhonchi or rales  Abdominal:      Palpations: Abdomen is soft  Tenderness: There is abdominal tenderness  There is no guarding or rebound  Comments: Minimal epigastric tenderness   Musculoskeletal:         General: No swelling, tenderness, deformity or signs of injury  Normal range of motion  Cervical back: Normal range of motion and neck supple  No rigidity or tenderness  Right lower leg: No edema  Left lower leg: No edema  Skin:     General: Skin is warm and dry  Coloration: Skin is not jaundiced  Findings: No bruising, erythema or rash  Neurological:      General: No focal deficit present  Mental Status: She is alert and oriented to person, place, and time  Cranial Nerves: No cranial nerve deficit  Sensory: No sensory deficit  Coordination: Coordination normal    Psychiatric:         Mood and Affect: Mood normal          Behavior: Behavior normal          Thought Content:  Thought content normal          Vital Signs  ED Triage Vitals   Temperature Pulse Respirations Blood Pressure SpO2   07/08/22 2059 07/08/22 2059 07/08/22 2059 07/08/22 2059 07/08/22 2059   98 8 °F (37 1 °C) 78 18 118/78 98 %      Temp src Heart Rate Source Patient Position - Orthostatic VS BP Location FiO2 (%)   -- 07/09/22 0052 07/09/22 0052 07/09/22 0052 --    Monitor Sitting Right arm       Pain Score       07/09/22 0052       No Pain           Vitals:    07/08/22 2059 07/09/22 0052   BP: 118/78 96/73   Pulse: 78 75   Patient Position - Orthostatic VS:  Sitting         Visual Acuity      ED Medications  Medications   sodium chloride 0 9 % bolus 1,000 mL (1,000 mL Intravenous New Bag 7/9/22 0008)   ondansetron (ZOFRAN) injection 4 mg (4 mg Intravenous Given 7/9/22 0008)       Diagnostic Studies  Results Reviewed     Procedure Component Value Units Date/Time    FLU/RSV/COVID - if FLU/RSV clinically relevant [[id number]]  (Normal) Collected: 07/09/22 0009    Lab Status: Final result Specimen: Nares from Nasopharyngeal Swab Updated: 07/09/22 0103     SARS-CoV-2 Negative     INFLUENZA A PCR Negative     INFLUENZA B PCR Negative     RSV PCR Negative    Narrative:      FOR PEDIATRIC PATIENTS - copy/paste COVID Guidelines URL to browser: [email or web address] org/  [email or web address]    SARS-CoV-2 assay is a Nucleic Acid Amplification assay intended for the  qualitative detection of nucleic acid from SARS-CoV-2 in nasopharyngeal  swabs  Results are for the presumptive identification of SARS-CoV-2 RNA  Positive results are indicative of infection with SARS-CoV-2, the virus  causing COVID-19, but do not rule out bacterial infection or co-infection  with other viruses  Laboratories within the [place] and its  territories are required to report all positive results to the appropriate  public health authorities  Negative results do not preclude SARS-CoV-2  infection and should not be used as the sole basis for treatment or other  patient management decisions  Negative results must be combined with  clinical observations, patient history, and epidemiological information    This test has not been FDA cleared or approved  This test has been authorized by FDA under an Emergency Use Authorization  (EUA)  This test is only authorized for the duration of time the  declaration that circumstances exist justifying the authorization of the  emergency use of an in vitro diagnostic tests for detection of SARS-CoV-2  virus and/or diagnosis of COVID-19 infection under section 564(b)(1) of  the Act, 21 U  S C  024LNO-7(Y)(4), unless the authorization is terminated  or revoked sooner  The test has been validated but independent review by FDA  and CLIA is pending  Test performed using [employer] GeneXpert: This RT-PCR assay targets N2,  a region unique to SARS-CoV-2  A conserved region in the E-gene was chosen  for pan-Sarbecovirus detection which includes SARS-CoV-2      HS Troponin I 2hr [[id number]]     Lab Status: No result Specimen: Blood     HS Troponin 0hr (reflex protocol) [[id number]]  (Normal) Collected: 07/09/22 0009    Lab Status: Final result Specimen: Blood from Line, Venous Updated: 07/09/22 0058     hs TnI 0hr 2 ng/L     UA (URINE) with reflex to Scope [[id number]]  (Abnormal) Collected: 07/08/22 2142    Lab Status: Final result Specimen: Urine, Clean Catch Updated: 07/08/22 2151     Color, UA Yellow     Clarity, UA Clear     Specific Gravity, UA >=1 030     pH, UA 6 0     Leukocytes, UA Negative     Nitrite, UA Negative     Protein, UA Negative mg/dl      Glucose, UA Negative mg/dl      Ketones, UA 40 (2+) mg/dl      Urobilinogen, UA 0 2 E U /dl      Bilirubin, UA Interference- unable to analyze     Occult Blood, UA Negative    POCT pregnancy, urine [[id number]]  (Abnormal) Resulted: 07/08/22 2144    Lab Status: Final result Specimen: Urine Updated: 07/08/22 2145     EXT PREG TEST UR (Ref: Negative) positive     Control valid    Comprehensive metabolic panel [[id number]]  (Abnormal) Collected: 07/08/22 2110    Lab Status: Final result Specimen: Blood from Arm, Right Updated: 07/08/22 2144     Sodium 132 mmol/L      Potassium 3 7 mmol/L      Chloride 100 mmol/L      CO2 25 mmol/L      ANION GAP 7 mmol/L      BUN 7 mg/dL      Creatinine 0 88 mg/dL      Glucose 144 mg/dL      Calcium 8 7 mg/dL      Corrected Calcium 9 5 mg/dL      AST 13 U/L      ALT 17 U/L      Alkaline Phosphatase 63 U/L      Total Protein 6 8 g/dL      Albumin 3 0 g/dL      Total Bilirubin 0 40 mg/dL      eGFR 87 ml/min/1 73sq m     Narrative:      National Kidney Disease Foundation guidelines for Chronic Kidney Disease (CKD):     Stage 1 with normal or high GFR (GFR > 90 mL/min/1 73 square meters)    Stage 2 Mild CKD (GFR = 60-89 mL/min/1 73 square meters)    Stage 3A Moderate CKD (GFR = 45-59 mL/min/1 73 square meters)    Stage 3B Moderate CKD (GFR = 30-44 mL/min/1 73 square meters)    Stage 4 Severe CKD (GFR = 15-29 mL/min/1 73 square meters)    Stage 5 End Stage CKD (GFR <15 mL/min/1 73 square meters)  Note: GFR calculation is accurate only with a steady state creatinine    Lipase [[id number]]  (Abnormal) Collected: 07/08/22 2110    Lab Status: Final result Specimen: Blood from Arm, Right Updated: 07/08/22 2144     Lipase 45 u/L     CBC and differential [[id number]]  (Abnormal) Collected: 07/08/22 2110    Lab Status: Final result Specimen: Blood from Arm, Right Updated: 07/08/22 2123     WBC 4 51 Thousand/uL      RBC 4 06 Million/uL      Hemoglobin 10 6 g/dL      Hematocrit 33 5 %      MCV 83 fL      MCH 26 1 pg      MCHC 31 6 g/dL      RDW 16 8 %      MPV 9 9 fL      Platelets 509 Thousands/uL      nRBC 0 /100 WBCs      Neutrophils Relative 78 %      Immat GRANS % 0 %      Lymphocytes Relative 15 %      Monocytes Relative 6 %      Eosinophils Relative 1 %      Basophils Relative 0 %      Neutrophils Absolute 3 48 Thousands/µL      Immature Grans Absolute 0 00 Thousand/uL      Lymphocytes Absolute 0 69 Thousands/µL      Monocytes Absolute 0 28 Thousand/µL      Eosinophils Absolute 0 05 Thousand/µL      Basophils Absolute 0 01 Thousands/µL No orders to display              Procedures  ECG 12 Lead Documentation Only    Date/Time: 7/8/2022 11:29 PM  Performed by: Marty Sorto DO  Authorized by: Marty Sorto DO     ECG reviewed by me, the ED Provider: yes    Patient location:  ED  Rate:     ECG rate:  68  Rhythm:     Rhythm: sinus rhythm    Conduction:     Conduction: normal    ST segments:     ST segments:  Non-specific             ED Course             HEART Risk Score    Flowsheet Row Most Recent Value   Heart Score Risk Calculator    History 0 Filed at: 07/09/2022 0103   ECG 1 Filed at: 07/09/2022 0103   Age 0 Filed at: 07/09/2022 0103   Risk Factors 1 Filed at: 07/09/2022 0103   Troponin 0 Filed at: 07/09/2022 0103   HEART Score 2 Filed at: 07/09/2022 0103                PERC Rule for PE    Flowsheet Row Most Recent Value   PERC Rule for PE    Age >=50 0 Filed at: 07/08/2022 2344   HR >=100 0 Filed at: 07/08/2022 2344   O2 Sat on room air < 95% 0 Filed at: 07/08/2022 2344   History of PE or DVT 0 Filed at: 07/08/2022 2344   Recent trauma or surgery 0 Filed at: 07/08/2022 2344   Hemoptysis 0 Filed at: 07/08/2022 2344   Exogenous estrogen 0 Filed at: 07/08/2022 2344   Unilateral leg swelling 0 Filed at: 07/08/2022 2344   PERC Rule for PE Results 0 Filed at: 07/08/2022 2344                  Tacevick Velarde' Criteria for PE    Flowsheet Row Most Recent Value   Wells' Criteria for PE    Clinical signs and symptoms of DVT 0 Filed at: 07/08/2022 2344   PE is primary diagnosis or equally likely 0 Filed at: 07/08/2022 2344   HR >100 0 Filed at: 07/08/2022 2344   Immobilization at least 3 days or Surgery in the previous 4 weeks 0 Filed at: 07/08/2022 2344   Previous, objectively diagnosed PE or DVT 0 Filed at: 07/08/2022 2344   Hemoptysis 0 Filed at: 07/08/2022 2344   Malignancy with treatment within 6 months or palliative 0 Filed at: 07/08/2022 2344   Tacevick Shoe' Criteria Total 0 Filed at: 07/08/2022 2344                MDM  Number of Diagnoses or Management Options  Atypical chest pain  Pregnancy  Diagnosis management comments:  Atypical chest pain rule out acute coronary syndrome workup in progress troponin EKG  Would also check COVID-19 test   Benign abdominal exam        Amount and/or Complexity of Data Reviewed  Clinical lab tests: ordered        Disposition  Final diagnoses:   Atypical chest pain   Pregnancy     Time reflects when diagnosis was documented in both MDM as applicable and the Disposition within this note     Time User Action Codes Description Comment    7/8/2022 11:43 PM [employer] Add [R07 89] Atypical chest pain     7/8/2022 11:43 PM [employer] Add [B42 03] Pregnancy       ED Disposition     ED Disposition   Discharge    Condition   Stable    Date/Time   Sat Jul 9, 2022  1:04 AM    Comment   Arthur Holguin discharge to home/self care  Follow-up Information     Follow up With Specialties Details Why Contact Info Additional [address] [place] [address] Internal Medicine   [address] [facility] [phone]        [address] Emergency Department Emergency Medicine  As needed, If symptoms worsen [address] [address]  [phone] [address] Emergency Department, [address] [place] [address] 10    [facility]   [address] [facility] [address] [phone]       call your OBGYN doctor tomorrow to arrange close follow-up               Patient's Medications   Discharge Prescriptions    No medications on file       No discharge procedures on file      PDMP Review     None          ED Provider  Electronically Signed by           Meliton Mohr DO  07/09/22 0105

## 2022-10-07 ENCOUNTER — TELEPHONE (OUTPATIENT)
Dept: GASTROENTEROLOGY | Facility: CLINIC | Age: 32
End: 2022-10-07

## 2022-10-20 ENCOUNTER — OFFICE VISIT (OUTPATIENT)
Dept: GASTROENTEROLOGY | Facility: CLINIC | Age: 32
End: 2022-10-20
Payer: COMMERCIAL

## 2022-10-20 VITALS
WEIGHT: 105 LBS | BODY MASS INDEX: 19.32 KG/M2 | HEIGHT: 62 IN | SYSTOLIC BLOOD PRESSURE: 122 MMHG | DIASTOLIC BLOOD PRESSURE: 82 MMHG

## 2022-10-20 DIAGNOSIS — K50.111 CROHN'S DISEASE OF COLON WITH RECTAL BLEEDING (HCC): Primary | ICD-10-CM

## 2022-10-20 DIAGNOSIS — Z3A.21 21 WEEKS GESTATION OF PREGNANCY: ICD-10-CM

## 2022-10-20 PROCEDURE — 99214 OFFICE O/P EST MOD 30 MIN: CPT | Performed by: NURSE PRACTITIONER

## 2022-10-20 NOTE — LETTER
October 20, 2022     Antonio Hernandez, [address] [facility]    Patient: Stone Berger   YOB: 1990   Date of Visit: 10/20/2022       Dear Dr Cipriano Recio: Thank you for referring Stone Berger to me for evaluation  Below are my notes for this consultation  If you have questions, please do not hesitate to call me  I look forward to following your patient along with you  Sincerely,        MUSA Iyer        CC: No Recipients  MUSA Iyer  10/20/2022  4:32 PM  Sign when Signing Visit  [address] [facility] - Outpatient Follow-up Note  Stone Berger 27 y o  female MRN: [id number]  Encounter: [id number]    ASSESSMENT AND PLAN:      1  Crohn's disease of colon with rectal bleeding ([address] ) with acute flare  Longstanding history of Crohn's disease  Now 21 weeks pregnant  Colonoscopy 04/2020 showed Crohn's stricture in mid transverse colon, ulcerations in rectum, small pseudo polyp in the cecum  Previously taking Stelara-discontinued approximately 1 year ago, not currently on any medications  Denies active Crohn's symptoms  Discussed with Dr Vinicius Vivas  Will check fecal calprotectin  Refer to Dr Shawanda Tillman at [facility] for IBD management during pregnancy    2  Iron deficiency anemia  Patient reports iron deficiency, currently taking oral supplement  Follows with [facility] and scheduled to begin iron infusions tomorrow 10/21/2022    Followup Appointment:  With Dr Shawanda Tillman  ______________________________________________________________________    Chief Complaint   Patient presents with   • Follow-up     HPI:  Patient presents in follow-up for Crohn's disease  She is currently 21 weeks pregnant  Diagnosed with Crohn's disease at age 11  Previously treated with Remicade, methotrexate and Cimzia with only short lasting improvement in her symptoms     Last colonoscopy April 2020 showed benign appearing inflamed stricture in transverse colon which was traversible, multiple superficial ulcers in the distal rectum, small cecal polyp excised  Biopsies negative for malignancy     She was started on Stelara in spring of 2020 with some initial improvement in her symptoms however she felt that after several months, was ineffective  She self discontinued approximately 1 year ago  Currently controlling symptoms with healthy diet and exercise    She did have exacerbation of her symptoms when she 1st became pregnant with constipation and lower abdominal pain  Symptoms resolved without treatment  Had hyperemesis initially, difficulty eating and lost significant weight  The symptoms have also resolved and her weight has been stable  She is moving her bowels daily and denies constipation  Takes Senna daily   No melena but has noted scant blood with wiping at times     She has been diagnosed with iron deficiency anemia  Hemoglobin in July was 10 6-has been having monthly blood tests but those results are not available to me  Currently following with [facility] and taking oral iron supplements however she is scheduled to start iron infusions tomorrow      Historical Information   Past Medical History:   Diagnosis Date   • Anxiety    • Crohn disease ([address] )      Past Surgical History:   Procedure Laterality Date   • COLONOSCOPY  04/10/2020    Crohn's stricture in the mid transverse colon, mild inflammation and shallow ulcerations, small pseudo polyps in the cecum, anal rectal superficial ulcers, biopsies negative for dysplasia, metaplasia recall 2 years   • WISDOM TOOTH EXTRACTION       Social History     Substance and Sexual Activity   Alcohol Use Not Currently     Social History     Substance and Sexual Activity   Drug Use Yes   • Types: Marijuana    Comment: hx of percocet and dilaudid abuse, marijuana states is mediocak     Social History     Tobacco Use   Smoking Status Former Smoker   • Packs/day: 0 10   • Years: 1 00   • Pack years: 0 10   • Types: Cigarettes   • Start date:    • Quit date:    • Years since quittin 8   Smokeless Tobacco Never Used     Family History   Problem Relation Age of Onset   • Heart disease Mother    • Diabetes Mother    • Hypertension Mother    • Heart disease Father    • Diabetes Father    • Hypertension Father    • Colon cancer Neg Hx    • Colon polyps Neg Hx          Current Outpatient Medications:   •  buprenorphine (SUBUTEX) 2 mg  •  sertraline (ZOLOFT) 100 mg tablet  •  buprenorphine-naloxone (SUBOXONE) 8-2 mg  •  dicyclomine (BENTYL) 10 mg capsule  •  ferrous sulfate 325 (65 Fe) mg tablet  •  Spironolactone (ALDACTONE PO)  •  ustekinumab (Stelara) 90 mg/mL subcutaneous injection  Allergies   Allergen Reactions   • Gabapentin Seizures   • Vancomycin Hives     Reviewed medications and allergies and updated as indicated    PHYSICAL EXAM:    Blood pressure 122/82, height 5' 2" (1 575 m), weight 47 6 kg (105 lb), last menstrual period 2022  Body mass index is 19 2 kg/m²  General Appearance: NAD, cooperative, alert  ENT:  Normocephalic, atraumatic, normal mucosa  Neck:  Supple, symmetrical, trachea midline  Resp:  Clear to auscultation bilaterally; no rales, rhonchi or wheezing; respirations unlabored   CV:  S1 S2, Regular rate and rhythm; no murmur, rub, or gallop  GI:  Soft, non-tender, non-distended; normal bowel sounds; no masses, no organomegaly   Rectal: Deferred  Musculoskeletal: No cyanosis, clubbing or edema  Normal ROM    Skin:  No jaundice, rashes, or lesions   Psych: Normal affect, good eye contact  Neuro: No gross deficits, AAOx3    Lab Results:   Lab Results   Component Value Date    WBC 4 51 2022    HGB 10 6 (L) 2022    HCT 33 5 (L) 2022    MCV 83 2022     2022     Lab Results   Component Value Date     2015    K 3 7 2022     2022    CO2 25 2022    ANIONGAP 6 2015    BUN 7 2022    CREATININE 0 88 07/08/2022    GLUCOSE 91 07/28/2015    CALCIUM 8 7 07/08/2022    CORRECTEDCA 9 5 07/08/2022    AST 13 07/08/2022    ALT 17 07/08/2022    ALKPHOS 63 07/08/2022    PROT 6 1 (L) 07/28/2015    BILITOT 0 3 07/28/2015    EGFR 87 07/08/2022     Lab Results   Component Value Date    IRON 112 09/24/2020    TIBC 263 09/24/2020    FERRITIN 40 09/24/2020     Lab Results   Component Value Date    LIPASE 45 (L) 07/08/2022

## 2022-10-20 NOTE — PROGRESS NOTES
[id number] - Outpatient Follow-up Note  Nora Woody 27 y o  female MRN: [id number]  Encounter: [id number]    ASSESSMENT AND PLAN:      1  Crohn's disease of colon with rectal bleeding ([address] ) with acute flare  Longstanding history of Crohn's disease  Now 21 weeks pregnant  Colonoscopy 04/2020 showed Crohn's stricture in mid transverse colon, ulcerations in rectum, small pseudo polyp in the cecum  Previously taking Stelara-discontinued approximately 1 year ago, not currently on any medications  Denies active Crohn's symptoms  Discussed with Dr Scarlett Rucker  Will check fecal calprotectin  Refer to Dr Manasa Pacheco at [facility] for IBD management during pregnancy    2  Iron deficiency anemia  Patient reports iron deficiency, currently taking oral supplement  Follows with [facility] and scheduled to begin iron infusions tomorrow 10/21/2022    Followup Appointment:  With Dr Manasa Pacheco  ______________________________________________________________________    Chief Complaint   Patient presents with   • Follow-up     HPI:  Patient presents in follow-up for Crohn's disease  She is currently 21 weeks pregnant  Diagnosed with Crohn's disease at age 11  Previously treated with Remicade, methotrexate and Cimzia with only short lasting improvement in her symptoms  Last colonoscopy April 2020 showed benign appearing inflamed stricture in transverse colon which was traversible, multiple superficial ulcers in the distal rectum, small cecal polyp excised  Biopsies negative for malignancy     She was started on Stelara in spring of 2020 with some initial improvement in her symptoms however she felt that after several months, was ineffective  She self discontinued approximately 1 year ago  Currently controlling symptoms with healthy diet and exercise    She did have exacerbation of her symptoms when she 1st became pregnant with constipation and lower abdominal pain    Symptoms resolved without treatment  Had hyperemesis initially, difficulty eating and lost significant weight  The symptoms have also resolved and her weight has been stable  She is moving her bowels daily and denies constipation  Takes Senna daily   No melena but has noted scant blood with wiping at times     She has been diagnosed with iron deficiency anemia  Hemoglobin in July was 10 6-has been having monthly blood tests but those results are not available to me  Currently following with [facility] and taking oral iron supplements however she is scheduled to start iron infusions tomorrow      Historical Information   Past Medical History:   Diagnosis Date   • Anxiety    • Crohn disease ([address] )      Past Surgical History:   Procedure Laterality Date   • COLONOSCOPY  04/10/2020    Crohn's stricture in the mid transverse colon, mild inflammation and shallow ulcerations, small pseudo polyps in the cecum, anal rectal superficial ulcers, biopsies negative for dysplasia, metaplasia recall 2 years   • WISDOM TOOTH EXTRACTION       Social History     Substance and Sexual Activity   Alcohol Use Not Currently     Social History     Substance and Sexual Activity   Drug Use Yes   • Types: Marijuana    Comment: hx of percocet and dilaudid abuse, marijuana states is mediocak     Social History     Tobacco Use   Smoking Status Former Smoker   • Packs/day: 0 10   • Years: 1 00   • Pack years: 0 10   • Types: Cigarettes   • Start date:    • Quit date:    • Years since quittin 8   Smokeless Tobacco Never Used     Family History   Problem Relation Age of Onset   • Heart disease Mother    • Diabetes Mother    • Hypertension Mother    • Heart disease Father    • Diabetes Father    • Hypertension Father    • Colon cancer Neg Hx    • Colon polyps Neg Hx          Current Outpatient Medications:   •  buprenorphine (SUBUTEX) 2 mg  •  sertraline (ZOLOFT) 100 mg tablet  •  buprenorphine-naloxone (SUBOXONE) 8-2 mg  •  dicyclomine (BENTYL) 10 mg capsule  •  ferrous sulfate 325 (65 Fe) mg tablet  •  Spironolactone (ALDACTONE PO)  •  ustekinumab (Stelara) 90 mg/mL subcutaneous injection  Allergies   Allergen Reactions   • Gabapentin Seizures   • Vancomycin Hives     Reviewed medications and allergies and updated as indicated    PHYSICAL EXAM:    Blood pressure 122/82, height 5' 2" (1 575 m), weight 47 6 kg (105 lb), last menstrual period 05/28/2022  Body mass index is 19 2 kg/m²  General Appearance: NAD, cooperative, alert  ENT:  Normocephalic, atraumatic, normal mucosa  Neck:  Supple, symmetrical, trachea midline  Resp:  Clear to auscultation bilaterally; no rales, rhonchi or wheezing; respirations unlabored   CV:  S1 S2, Regular rate and rhythm; no murmur, rub, or gallop  GI:  Soft, non-tender, non-distended; normal bowel sounds; no masses, no organomegaly   Rectal: Deferred  Musculoskeletal: No cyanosis, clubbing or edema  Normal ROM    Skin:  No jaundice, rashes, or lesions   Psych: Normal affect, good eye contact  Neuro: No gross deficits, AAOx3    Lab Results:   Lab Results   Component Value Date    WBC 4 51 07/08/2022    HGB 10 6 (L) 07/08/2022    HCT 33 5 (L) 07/08/2022    MCV 83 07/08/2022     07/08/2022     Lab Results   Component Value Date     07/28/2015    K 3 7 07/08/2022     07/08/2022    CO2 25 07/08/2022    ANIONGAP 6 07/28/2015    BUN 7 07/08/2022    CREATININE 0 88 07/08/2022    GLUCOSE 91 07/28/2015    CALCIUM 8 7 07/08/2022    CORRECTEDCA 9 5 07/08/2022    AST 13 07/08/2022    ALT 17 07/08/2022    ALKPHOS 63 07/08/2022    PROT 6 1 (L) 07/28/2015    BILITOT 0 3 07/28/2015    EGFR 87 07/08/2022     Lab Results   Component Value Date    IRON 112 09/24/2020    TIBC 263 09/24/2020    FERRITIN 40 09/24/2020     Lab Results   Component Value Date    LIPASE 45 (L) 07/08/2022

## 2022-10-21 ENCOUNTER — TELEPHONE (OUTPATIENT)
Dept: GASTROENTEROLOGY | Facility: CLINIC | Age: 32
End: 2022-10-21

## 2022-10-21 NOTE — TELEPHONE ENCOUNTER
----- Message from [address] sent at 10/20/2022  5:21 PM EDT -----  Hi Dr Olvera - I saw this patient today in the office  She has longstanding history of Crohn's, has been on multiple biologics which she felt did not help her  Most recently was on Stelara in 2020 however she self-discontinued this over a year ago and is currently not on any medications for her Crohn's  Colonoscopy in 2020 showed active and stricturing disease  She has not been seen in over 1 year and came in today  She is now 21 weeks pregnant! I talked to Dr Kenya Bautista and he felt that it would be best if she saw you for management during her pregnancy  I am going to order a fecal calprotectin  Will your office contact her with an appointment or should I have her call the [facility] office    Thank you so much for your help

## 2022-10-21 NOTE — TELEPHONE ENCOUNTER
Hi,    Can you please call the patient and niecy her in for a visit within the next 1-2 weeks with me?     Thank you

## 2022-10-21 NOTE — TELEPHONE ENCOUNTER
Left message for patient that due to limited availability I currently scheduled her for a f/u appointment with Dr Rajeev Morgan and asked to please return my call if date and time are ok  Back line phone number given

## 2022-11-23 ENCOUNTER — TELEPHONE (OUTPATIENT)
Dept: GASTROENTEROLOGY | Facility: CLINIC | Age: 32
End: 2022-11-23

## 2023-02-17 ENCOUNTER — TELEPHONE (OUTPATIENT)
Dept: PEDIATRICS CLINIC | Facility: CLINIC | Age: 33
End: 2023-02-17

## 2024-05-17 DIAGNOSIS — K50.111 CROHN'S DISEASE OF COLON WITH RECTAL BLEEDING (HCC): Primary | ICD-10-CM

## 2024-05-17 RX ORDER — SODIUM CHLORIDE 9 MG/ML
20 INJECTION, SOLUTION INTRAVENOUS ONCE
OUTPATIENT
Start: 2024-05-17